# Patient Record
Sex: FEMALE | Race: WHITE | NOT HISPANIC OR LATINO | Employment: OTHER | URBAN - NONMETROPOLITAN AREA
[De-identification: names, ages, dates, MRNs, and addresses within clinical notes are randomized per-mention and may not be internally consistent; named-entity substitution may affect disease eponyms.]

---

## 2023-03-22 DIAGNOSIS — I10 PRIMARY HYPERTENSION: Primary | ICD-10-CM

## 2023-03-22 RX ORDER — CARVEDILOL 6.25 MG/1
6.25 TABLET ORAL 2 TIMES DAILY
COMMUNITY
Start: 2020-02-04 | End: 2023-03-22 | Stop reason: SDUPTHER

## 2023-03-22 RX ORDER — CARVEDILOL 6.25 MG/1
6.25 TABLET ORAL 2 TIMES DAILY
Qty: 180 TABLET | Refills: 0 | Status: SHIPPED | OUTPATIENT
Start: 2023-03-22 | End: 2023-06-16

## 2023-03-30 DIAGNOSIS — F33.0 DEPRESSION, MAJOR, RECURRENT, MILD (CMS-HCC): ICD-10-CM

## 2023-03-30 PROBLEM — F41.9 MILD ANXIETY: Status: ACTIVE | Noted: 2023-03-30

## 2023-03-30 RX ORDER — SERTRALINE HYDROCHLORIDE 100 MG/1
100 TABLET, FILM COATED ORAL DAILY
COMMUNITY
Start: 2019-06-26 | End: 2023-03-31 | Stop reason: SDUPTHER

## 2023-03-31 DIAGNOSIS — E78.00 HYPERCHOLESTEROLEMIA: ICD-10-CM

## 2023-03-31 DIAGNOSIS — E11.29 TYPE 2 DIABETES MELLITUS WITH MICROALBUMINURIA, UNSPECIFIED WHETHER LONG TERM INSULIN USE: ICD-10-CM

## 2023-03-31 DIAGNOSIS — R80.9 TYPE 2 DIABETES MELLITUS WITH MICROALBUMINURIA, UNSPECIFIED WHETHER LONG TERM INSULIN USE: ICD-10-CM

## 2023-03-31 DIAGNOSIS — J30.9 ALLERGIC RHINITIS, UNSPECIFIED SEASONALITY, UNSPECIFIED TRIGGER: ICD-10-CM

## 2023-03-31 RX ORDER — FLUTICASONE PROPIONATE 50 MCG
2 SPRAY, SUSPENSION (ML) NASAL DAILY
COMMUNITY
Start: 2016-06-07 | End: 2023-03-31 | Stop reason: SDUPTHER

## 2023-03-31 RX ORDER — FLUTICASONE PROPIONATE 50 MCG
2 SPRAY, SUSPENSION (ML) NASAL DAILY
Qty: 16 G | Refills: 3 | Status: SHIPPED | OUTPATIENT
Start: 2023-03-31

## 2023-03-31 RX ORDER — SERTRALINE HYDROCHLORIDE 100 MG/1
TABLET, FILM COATED ORAL
Qty: 90 TABLET | Refills: 3 | Status: SHIPPED | OUTPATIENT
Start: 2023-03-31 | End: 2024-03-18

## 2023-03-31 RX ORDER — METFORMIN HYDROCHLORIDE 500 MG/1
500 TABLET, EXTENDED RELEASE ORAL 2 TIMES DAILY
COMMUNITY
Start: 2020-12-16 | End: 2023-03-31 | Stop reason: SDUPTHER

## 2023-03-31 RX ORDER — METFORMIN HYDROCHLORIDE 500 MG/1
500 TABLET, EXTENDED RELEASE ORAL 2 TIMES DAILY
Qty: 60 TABLET | Refills: 11 | Status: SHIPPED | OUTPATIENT
Start: 2023-03-31 | End: 2023-04-27 | Stop reason: DRUGHIGH

## 2023-03-31 RX ORDER — ROSUVASTATIN CALCIUM 20 MG/1
20 TABLET, COATED ORAL DAILY
Qty: 90 TABLET | Refills: 3 | Status: SHIPPED | OUTPATIENT
Start: 2023-03-31 | End: 2024-02-09

## 2023-03-31 RX ORDER — ROSUVASTATIN CALCIUM 20 MG/1
1 TABLET, COATED ORAL DAILY
COMMUNITY
Start: 2018-05-17 | End: 2023-03-31 | Stop reason: SDUPTHER

## 2023-04-19 PROBLEM — R80.9 PROTEINURIA: Status: ACTIVE | Noted: 2023-04-19

## 2023-04-19 PROBLEM — N18.31 STAGE 3A CHRONIC KIDNEY DISEASE (MULTI): Status: ACTIVE | Noted: 2023-04-19

## 2023-04-19 PROBLEM — G47.00 INSOMNIA: Status: ACTIVE | Noted: 2023-04-19

## 2023-04-19 PROBLEM — E83.52 HYPERCALCEMIA: Status: ACTIVE | Noted: 2023-04-19

## 2023-04-19 PROBLEM — N28.9 RENAL INSUFFICIENCY: Status: ACTIVE | Noted: 2023-04-19

## 2023-04-19 PROBLEM — F33.2 MODERATELY SEVERE RECURRENT MAJOR DEPRESSION (MULTI): Status: ACTIVE | Noted: 2023-04-19

## 2023-04-19 PROBLEM — E21.3 HYPERPARATHYROIDISM (MULTI): Status: ACTIVE | Noted: 2023-04-19

## 2023-04-19 PROBLEM — E55.9 VITAMIN D DEFICIENCY: Status: ACTIVE | Noted: 2023-04-19

## 2023-04-19 PROBLEM — F41.9 SEVERE ANXIETY: Status: ACTIVE | Noted: 2023-04-19

## 2023-04-19 RX ORDER — LORATADINE 10 MG/1
TABLET ORAL
COMMUNITY

## 2023-04-19 RX ORDER — LISINOPRIL 40 MG/1
1 TABLET ORAL DAILY
COMMUNITY
Start: 2017-09-06 | End: 2023-09-06 | Stop reason: SDUPTHER

## 2023-04-19 RX ORDER — BUPROPION HYDROCHLORIDE 300 MG/1
1 TABLET ORAL DAILY
COMMUNITY
Start: 2016-06-07 | End: 2023-08-07 | Stop reason: SDUPTHER

## 2023-04-19 RX ORDER — AMLODIPINE BESYLATE 10 MG/1
1 TABLET ORAL DAILY
COMMUNITY
Start: 2016-06-07 | End: 2023-10-16

## 2023-04-19 RX ORDER — CARVEDILOL 6.25 MG/1
TABLET ORAL
COMMUNITY
Start: 2023-03-22 | End: 2023-04-24

## 2023-04-19 RX ORDER — CHOLECALCIFEROL (VITAMIN D3) 25 MCG
TABLET ORAL
COMMUNITY

## 2023-04-20 NOTE — PROGRESS NOTES
SUBJECTIVE:      Dara Shook. Is 67 y.o.. female. Here for follow up office visit-     HPI:      Follow up for BP, DM, CKD, anx, dep , allergies, insomnia, vit d , chol       Pt is doing well      Scales reviewed      Due for lab       Other medical specialists are involved in patient's care.    Any recent notes that were available were reviewed.    All conditions are monitored, evaluated and assessed regularly.    Patient is compliant with current treatment regimen/medications.    Specialists involved include:    Dr Darrel Barlow         REVIEW OF SYSTEMS:        OBJECTIVE:    There were no vitals filed for this visit.    PHYSICAL:      Patient is alert and oriented x 3 , NAD  HEAD- normocephalic and atraumatic   EYES-conjunctiva-normal, lids - normal  EARS/NOSE- normal external exam   NECK-supple,FROM  CV- RRR without murmur  PULM- CTA bilaterally, normal respiratory effort  RESPIRATORY EFFORT- normal , no retractions or nasal flaring   ABD- normoactive BS's   EXT- no edema,NT  SKIN- no abnormal skin lesions noted  NEURO- no focal deficits  PSYCH- pleasant, normal judgement and insight        The number and complexity of problems addressed is considered moderate.  The amount and/or complexity of data reviewed and analyzed is considered moderate. The risk of complications and/or morbidity/mortality of patient is considered moderate. Overall, this patient encounter is considered a moderate risk visit.    Possible side effects and cautions  All SSRIs are thought to work in a similar way and generally can cause similar side effects, though some people may not experience any. Many side effects may go away after the first few weeks of treatment, while others may lead you and your doctor to try a different drug.    If you can't tolerate one SSRI, you may be able to tolerate a different one, as SSRIs differ in their potencies at blocking serotonin reuptake and in how quickly the body eliminates (metabolizes)  the drug.    Possible side effects of SSRIs may include, among others:    Nausea, vomiting or diarrhea  Headache  Drowsiness  Dry mouth  Insomnia  Nervousness, agitation or restlessness  Dizziness  Sexual problems, such as reduced sexual desire, difficulty reaching orgasm or inability to maintain an erection (erectile dysfunction)  Impact on appetite, leading to weight loss or weight gain  Taking your medication with food may reduce the risk of nausea. Also, as long as your medication doesn't keep you from sleeping, you can reduce the impact of nausea by taking it at bedtime.    Which antidepressant is best for you depends on a number of issues, such as your symptoms and any other health conditions you may have. Ask your doctor and pharmacist about the most common possible side effects for your specific SSRI and read the patient medication guide that comes with the prescription.    Safety issues  SSRIs are generally safe for most people. However, in some circumstances they can cause problems. For example, high doses of citalopram may cause dangerous abnormal heart rhythms, so doses over 40 milligrams (mg) a day should be avoided according to the FDA and the . Issues to discuss with your doctor before you take an SSRI include:    Drug interactions. When taking an antidepressant, tell your doctor about any other prescription or over-the-counter medications, herbs or other supplements you're taking. Some antidepressants can interfere with the effectiveness of other medications, and some can cause dangerous reactions when combined with certain medications or herbal supplements.    For example, SSRIs may increase your risk of bleeding, especially when you're taking other medications that increase the risk of bleeding, such as nonsteroidal anti-inflammatory drugs (NSAIDs), aspirin, warfarin (Coumadin, Jantoven) and other blood thinners.    Serotonin syndrome. Rarely, an antidepressant can cause high levels of  serotonin to accumulate in your body. Serotonin syndrome most often occurs when two medications that raise the level of serotonin are combined. These include, for example, other antidepressants, certain pain or headache medications, and the herbal supplement Monica's wort.    Signs and symptoms of serotonin syndrome include anxiety, agitation, high fever, sweating, confusion, tremors, restlessness, lack of coordination, major changes in blood pressure and a rapid heart rate. Seek immediate medical attention if you have any of these signs or symptoms.    Antidepressants and pregnancy. Talk to your doctor about the risks and benefits of using specific antidepressants. Some antidepressants may harm your baby if you take them during pregnancy or while you're breast-feeding. If you're taking an antidepressant and you're considering getting pregnant, talk to your doctor about the possible risks. Don't stop taking your medication without contacting your doctor first, as stopping might pose risks for you.  Suicide risk and antidepressants  Most antidepressants are generally safe, but the FDA requires that all antidepressants carry black box warnings, the strictest warnings for prescriptions. In some cases, children, teenagers and young adults under 25 may have an increase in suicidal thoughts or behavior when taking antidepressants, especially in the first few weeks after starting or when the dose is changed.        ASSESSMENT/PLAN:    1. Primary hypertension        2. Stage 3a chronic kidney disease        3. Type 2 diabetes mellitus with microalbuminuria, without long-term current use of insulin (CMS/HCC)        4. Mild anxiety        5. Depression, major, recurrent, mild (CMS/HCC)        6. Allergic rhinitis, unspecified seasonality, unspecified trigger        7. Insomnia, unspecified type        8. Hypercholesterolemia        9. Vitamin D deficiency            No orders of the defined types were placed in this  encounter.        Follow with specialist as directed       Continue medication      Ordered lab      Follow up in 6 months

## 2023-04-24 ENCOUNTER — OFFICE VISIT (OUTPATIENT)
Dept: PRIMARY CARE | Facility: CLINIC | Age: 68
End: 2023-04-24
Payer: MEDICARE

## 2023-04-24 VITALS
HEART RATE: 60 BPM | RESPIRATION RATE: 14 BRPM | DIASTOLIC BLOOD PRESSURE: 75 MMHG | BODY MASS INDEX: 28.32 KG/M2 | OXYGEN SATURATION: 95 % | SYSTOLIC BLOOD PRESSURE: 133 MMHG | WEIGHT: 165.9 LBS | TEMPERATURE: 97.3 F | HEIGHT: 64 IN

## 2023-04-24 DIAGNOSIS — F33.0 DEPRESSION, MAJOR, RECURRENT, MILD (CMS-HCC): ICD-10-CM

## 2023-04-24 DIAGNOSIS — N18.31 STAGE 3A CHRONIC KIDNEY DISEASE (MULTI): ICD-10-CM

## 2023-04-24 DIAGNOSIS — G47.00 INSOMNIA, UNSPECIFIED TYPE: ICD-10-CM

## 2023-04-24 DIAGNOSIS — R22.32 SUBCUTANEOUS MASS OF LEFT THUMB: ICD-10-CM

## 2023-04-24 DIAGNOSIS — I10 PRIMARY HYPERTENSION: Primary | ICD-10-CM

## 2023-04-24 DIAGNOSIS — F41.9 MILD ANXIETY: ICD-10-CM

## 2023-04-24 DIAGNOSIS — E11.29 TYPE 2 DIABETES MELLITUS WITH MICROALBUMINURIA, WITHOUT LONG-TERM CURRENT USE OF INSULIN (MULTI): ICD-10-CM

## 2023-04-24 DIAGNOSIS — E78.00 HYPERCHOLESTEREMIA: ICD-10-CM

## 2023-04-24 DIAGNOSIS — E55.9 VITAMIN D DEFICIENCY: ICD-10-CM

## 2023-04-24 DIAGNOSIS — E78.00 HYPERCHOLESTEROLEMIA: ICD-10-CM

## 2023-04-24 DIAGNOSIS — R80.9 TYPE 2 DIABETES MELLITUS WITH MICROALBUMINURIA, WITHOUT LONG-TERM CURRENT USE OF INSULIN (MULTI): ICD-10-CM

## 2023-04-24 DIAGNOSIS — J30.9 ALLERGIC RHINITIS, UNSPECIFIED SEASONALITY, UNSPECIFIED TRIGGER: ICD-10-CM

## 2023-04-24 PROCEDURE — 1036F TOBACCO NON-USER: CPT | Performed by: FAMILY MEDICINE

## 2023-04-24 PROCEDURE — 1159F MED LIST DOCD IN RCRD: CPT | Performed by: FAMILY MEDICINE

## 2023-04-24 PROCEDURE — 4010F ACE/ARB THERAPY RXD/TAKEN: CPT | Performed by: FAMILY MEDICINE

## 2023-04-24 PROCEDURE — 3075F SYST BP GE 130 - 139MM HG: CPT | Performed by: FAMILY MEDICINE

## 2023-04-24 PROCEDURE — 1160F RVW MEDS BY RX/DR IN RCRD: CPT | Performed by: FAMILY MEDICINE

## 2023-04-24 PROCEDURE — 99214 OFFICE O/P EST MOD 30 MIN: CPT | Performed by: FAMILY MEDICINE

## 2023-04-24 PROCEDURE — 3078F DIAST BP <80 MM HG: CPT | Performed by: FAMILY MEDICINE

## 2023-04-24 ASSESSMENT — PATIENT HEALTH QUESTIONNAIRE - PHQ9
1. LITTLE INTEREST OR PLEASURE IN DOING THINGS: SEVERAL DAYS
7. TROUBLE CONCENTRATING ON THINGS, SUCH AS READING THE NEWSPAPER OR WATCHING TELEVISION: MORE THAN HALF THE DAYS
4. FEELING TIRED OR HAVING LITTLE ENERGY: MORE THAN HALF THE DAYS
SUM OF ALL RESPONSES TO PHQ9 QUESTIONS 1 AND 2: 2
5. POOR APPETITE OR OVEREATING: NOT AT ALL
10. IF YOU CHECKED OFF ANY PROBLEMS, HOW DIFFICULT HAVE THESE PROBLEMS MADE IT FOR YOU TO DO YOUR WORK, TAKE CARE OF THINGS AT HOME, OR GET ALONG WITH OTHER PEOPLE: SOMEWHAT DIFFICULT
SUM OF ALL RESPONSES TO PHQ QUESTIONS 1-9: 12
2. FEELING DOWN, DEPRESSED OR HOPELESS: SEVERAL DAYS
8. MOVING OR SPEAKING SO SLOWLY THAT OTHER PEOPLE COULD HAVE NOTICED. OR THE OPPOSITE, BEING SO FIGETY OR RESTLESS THAT YOU HAVE BEEN MOVING AROUND A LOT MORE THAN USUAL: MORE THAN HALF THE DAYS
9. THOUGHTS THAT YOU WOULD BE BETTER OFF DEAD, OR OF HURTING YOURSELF: NOT AT ALL
6. FEELING BAD ABOUT YOURSELF - OR THAT YOU ARE A FAILURE OR HAVE LET YOURSELF OR YOUR FAMILY DOWN: SEVERAL DAYS
3. TROUBLE FALLING OR STAYING ASLEEP OR SLEEPING TOO MUCH: NEARLY EVERY DAY

## 2023-04-24 ASSESSMENT — ANXIETY QUESTIONNAIRES
3. WORRYING TOO MUCH ABOUT DIFFERENT THINGS: SEVERAL DAYS
4. TROUBLE RELAXING: NOT AT ALL
GAD7 TOTAL SCORE: 6
6. BECOMING EASILY ANNOYED OR IRRITABLE: MORE THAN HALF THE DAYS
7. FEELING AFRAID AS IF SOMETHING AWFUL MIGHT HAPPEN: NOT AT ALL
5. BEING SO RESTLESS THAT IT IS HARD TO SIT STILL: SEVERAL DAYS
IF YOU CHECKED OFF ANY PROBLEMS ON THIS QUESTIONNAIRE, HOW DIFFICULT HAVE THESE PROBLEMS MADE IT FOR YOU TO DO YOUR WORK, TAKE CARE OF THINGS AT HOME, OR GET ALONG WITH OTHER PEOPLE: SOMEWHAT DIFFICULT
2. NOT BEING ABLE TO STOP OR CONTROL WORRYING: SEVERAL DAYS
1. FEELING NERVOUS, ANXIOUS, OR ON EDGE: SEVERAL DAYS

## 2023-04-24 NOTE — PROGRESS NOTES
"SUBJECTIVE:      Dara Shook. Is 67 y.o.. female. Here for follow up office visit-     HPI:      Follow up for BP, DM, CKD, anx, dep , allergies, insomnia, vit d , chol     Pt states that she thinks that she has a cyst on her left thumb knuckle- this has been on her thumb about 1 month    Pt is doing well      Scales reviewed      Due for lab       Taking 2 metformin daily       Other medical specialists are involved in patient's care.    Any recent notes that were available were reviewed.    All conditions are monitored, evaluated and assessed regularly.    Patient is compliant with current treatment regimen/medications.    Specialists involved include:    Dr Darrel Barlow         REVIEW OF SYSTEMS:        OBJECTIVE:    Vitals:    04/24/23 1027   BP: 133/75   BP Location: Left arm   Patient Position: Sitting   BP Cuff Size: Small adult   Pulse: 60   Resp: 14   Temp: 36.3 °C (97.3 °F)   TempSrc: Temporal   SpO2: 95%   Weight: 75.3 kg (165 lb 14.4 oz)   Height: 1.626 m (5' 4\")       PHYSICAL:      Patient is alert and oriented x 3 , NAD  HEAD- normocephalic and atraumatic   EYES-conjunctiva-normal, lids - normal  EARS/NOSE- normal external exam   NECK-supple,FROM  CV- RRR without murmur  PULM- CTA bilaterally, normal respiratory effort  RESPIRATORY EFFORT- normal , no retractions or nasal flaring   ABD- normoactive BS's   EXT- no edema,NT  SKIN- no abnormal skin lesions noted  NEURO- no focal deficits  PSYCH- pleasant, normal judgement and insight        The number and complexity of problems addressed is considered moderate.  The amount and/or complexity of data reviewed and analyzed is considered moderate. The risk of complications and/or morbidity/mortality of patient is considered moderate. Overall, this patient encounter is considered a moderate risk visit.    Possible side effects and cautions  All SSRIs are thought to work in a similar way and generally can cause similar side effects, though some " people may not experience any. Many side effects may go away after the first few weeks of treatment, while others may lead you and your doctor to try a different drug.    If you can't tolerate one SSRI, you may be able to tolerate a different one, as SSRIs differ in their potencies at blocking serotonin reuptake and in how quickly the body eliminates (metabolizes) the drug.    Possible side effects of SSRIs may include, among others:    Nausea, vomiting or diarrhea  Headache  Drowsiness  Dry mouth  Insomnia  Nervousness, agitation or restlessness  Dizziness  Sexual problems, such as reduced sexual desire, difficulty reaching orgasm or inability to maintain an erection (erectile dysfunction)  Impact on appetite, leading to weight loss or weight gain  Taking your medication with food may reduce the risk of nausea. Also, as long as your medication doesn't keep you from sleeping, you can reduce the impact of nausea by taking it at bedtime.    Which antidepressant is best for you depends on a number of issues, such as your symptoms and any other health conditions you may have. Ask your doctor and pharmacist about the most common possible side effects for your specific SSRI and read the patient medication guide that comes with the prescription.    Safety issues  SSRIs are generally safe for most people. However, in some circumstances they can cause problems. For example, high doses of citalopram may cause dangerous abnormal heart rhythms, so doses over 40 milligrams (mg) a day should be avoided according to the FDA and the . Issues to discuss with your doctor before you take an SSRI include:    Drug interactions. When taking an antidepressant, tell your doctor about any other prescription or over-the-counter medications, herbs or other supplements you're taking. Some antidepressants can interfere with the effectiveness of other medications, and some can cause dangerous reactions when combined with certain  medications or herbal supplements.    For example, SSRIs may increase your risk of bleeding, especially when you're taking other medications that increase the risk of bleeding, such as nonsteroidal anti-inflammatory drugs (NSAIDs), aspirin, warfarin (Coumadin, Jantoven) and other blood thinners.    Serotonin syndrome. Rarely, an antidepressant can cause high levels of serotonin to accumulate in your body. Serotonin syndrome most often occurs when two medications that raise the level of serotonin are combined. These include, for example, other antidepressants, certain pain or headache medications, and the herbal supplement Benkelman's wort.    Signs and symptoms of serotonin syndrome include anxiety, agitation, high fever, sweating, confusion, tremors, restlessness, lack of coordination, major changes in blood pressure and a rapid heart rate. Seek immediate medical attention if you have any of these signs or symptoms.    Antidepressants and pregnancy. Talk to your doctor about the risks and benefits of using specific antidepressants. Some antidepressants may harm your baby if you take them during pregnancy or while you're breast-feeding. If you're taking an antidepressant and you're considering getting pregnant, talk to your doctor about the possible risks. Don't stop taking your medication without contacting your doctor first, as stopping might pose risks for you.  Suicide risk and antidepressants  Most antidepressants are generally safe, but the FDA requires that all antidepressants carry black box warnings, the strictest warnings for prescriptions. In some cases, children, teenagers and young adults under 25 may have an increase in suicidal thoughts or behavior when taking antidepressants, especially in the first few weeks after starting or when the dose is changed.        ASSESSMENT/PLAN:    1. Primary hypertension  Basic Metabolic Panel      2. Stage 3a chronic kidney disease        3. Type 2 diabetes mellitus  with microalbuminuria, without long-term current use of insulin (CMS/HCC)  Hemoglobin A1C      4. Mild anxiety        5. Depression, major, recurrent, mild (CMS/HCC)        6. Allergic rhinitis, unspecified seasonality, unspecified trigger        7. Insomnia, unspecified type        8. Hypercholesterolemia  Hepatic Function Panel      9. Vitamin D deficiency  Vitamin D, Total      10. Hypercholesteremia  Lipid Panel      11. Subcutaneous mass of left thumb  Referral to Orthopaedic Surgery          Orders Placed This Encounter   Procedures    Basic Metabolic Panel    Hepatic Function Panel    Lipid Panel    Vitamin D, Total    Hemoglobin A1C    Referral to Orthopaedic Surgery         Follow with specialist as directed       Continue medication      Ordered lab      Follow up in 6 months

## 2023-04-26 ENCOUNTER — LAB (OUTPATIENT)
Dept: LAB | Facility: LAB | Age: 68
End: 2023-04-26
Payer: MEDICARE

## 2023-04-26 DIAGNOSIS — E11.29 TYPE 2 DIABETES MELLITUS WITH MICROALBUMINURIA, WITHOUT LONG-TERM CURRENT USE OF INSULIN (MULTI): ICD-10-CM

## 2023-04-26 DIAGNOSIS — I10 PRIMARY HYPERTENSION: ICD-10-CM

## 2023-04-26 DIAGNOSIS — E78.00 HYPERCHOLESTEROLEMIA: ICD-10-CM

## 2023-04-26 DIAGNOSIS — E55.9 VITAMIN D DEFICIENCY: ICD-10-CM

## 2023-04-26 DIAGNOSIS — E78.00 HYPERCHOLESTEREMIA: ICD-10-CM

## 2023-04-26 DIAGNOSIS — R80.9 TYPE 2 DIABETES MELLITUS WITH MICROALBUMINURIA, WITHOUT LONG-TERM CURRENT USE OF INSULIN (MULTI): ICD-10-CM

## 2023-04-26 LAB
ALANINE AMINOTRANSFERASE (SGPT) (U/L) IN SER/PLAS: 36 U/L (ref 7–45)
ALBUMIN (G/DL) IN SER/PLAS: 4.7 G/DL (ref 3.4–5)
ALKALINE PHOSPHATASE (U/L) IN SER/PLAS: 105 U/L (ref 33–136)
ANION GAP IN SER/PLAS: 13 MMOL/L (ref 10–20)
ASPARTATE AMINOTRANSFERASE (SGOT) (U/L) IN SER/PLAS: 25 U/L (ref 9–39)
BILIRUBIN DIRECT (MG/DL) IN SER/PLAS: 0.1 MG/DL (ref 0–0.3)
BILIRUBIN TOTAL (MG/DL) IN SER/PLAS: 0.5 MG/DL (ref 0–1.2)
CALCIDIOL (25 OH VITAMIN D3) (NG/ML) IN SER/PLAS: 50 NG/ML
CALCIUM (MG/DL) IN SER/PLAS: 9.7 MG/DL (ref 8.6–10.6)
CARBON DIOXIDE, TOTAL (MMOL/L) IN SER/PLAS: 28 MMOL/L (ref 21–32)
CHLORIDE (MMOL/L) IN SER/PLAS: 104 MMOL/L (ref 98–107)
CHOLESTEROL (MG/DL) IN SER/PLAS: 159 MG/DL (ref 0–199)
CHOLESTEROL IN HDL (MG/DL) IN SER/PLAS: 40 MG/DL
CHOLESTEROL/HDL RATIO: 4
CREATININE (MG/DL) IN SER/PLAS: 1.13 MG/DL (ref 0.5–1.05)
ESTIMATED AVERAGE GLUCOSE FOR HBA1C: 171 MG/DL
GFR FEMALE: 53 ML/MIN/1.73M2
GLUCOSE (MG/DL) IN SER/PLAS: 164 MG/DL (ref 74–99)
HEMOGLOBIN A1C/HEMOGLOBIN TOTAL IN BLOOD: 7.6 %
LDL: 58 MG/DL (ref 0–99)
NON HDL CHOLESTEROL: 119 MG/DL
POTASSIUM (MMOL/L) IN SER/PLAS: 4.6 MMOL/L (ref 3.5–5.3)
PROTEIN TOTAL: 7.2 G/DL (ref 6.4–8.2)
SODIUM (MMOL/L) IN SER/PLAS: 140 MMOL/L (ref 136–145)
TRIGLYCERIDE (MG/DL) IN SER/PLAS: 305 MG/DL (ref 0–149)
UREA NITROGEN (MG/DL) IN SER/PLAS: 18 MG/DL (ref 6–23)
VLDL: 61 MG/DL (ref 0–40)

## 2023-04-26 PROCEDURE — 80048 BASIC METABOLIC PNL TOTAL CA: CPT

## 2023-04-26 PROCEDURE — 82306 VITAMIN D 25 HYDROXY: CPT

## 2023-04-26 PROCEDURE — 83036 HEMOGLOBIN GLYCOSYLATED A1C: CPT

## 2023-04-26 PROCEDURE — 80076 HEPATIC FUNCTION PANEL: CPT

## 2023-04-26 PROCEDURE — 80061 LIPID PANEL: CPT

## 2023-04-26 PROCEDURE — 36415 COLL VENOUS BLD VENIPUNCTURE: CPT

## 2023-04-27 DIAGNOSIS — R80.9 TYPE 2 DIABETES MELLITUS WITH MICROALBUMINURIA, UNSPECIFIED WHETHER LONG TERM INSULIN USE: ICD-10-CM

## 2023-04-27 DIAGNOSIS — E11.29 TYPE 2 DIABETES MELLITUS WITH MICROALBUMINURIA, WITHOUT LONG-TERM CURRENT USE OF INSULIN (MULTI): ICD-10-CM

## 2023-04-27 DIAGNOSIS — E11.29 TYPE 2 DIABETES MELLITUS WITH MICROALBUMINURIA, UNSPECIFIED WHETHER LONG TERM INSULIN USE: ICD-10-CM

## 2023-04-27 DIAGNOSIS — R80.9 TYPE 2 DIABETES MELLITUS WITH MICROALBUMINURIA, WITHOUT LONG-TERM CURRENT USE OF INSULIN (MULTI): ICD-10-CM

## 2023-04-27 DIAGNOSIS — I10 PRIMARY HYPERTENSION: Primary | ICD-10-CM

## 2023-04-27 RX ORDER — METFORMIN HYDROCHLORIDE 500 MG/1
TABLET, EXTENDED RELEASE ORAL
Qty: 60 TABLET | Refills: 11 | Status: SHIPPED | OUTPATIENT
Start: 2023-04-27 | End: 2024-02-09

## 2023-05-12 LAB
ANION GAP IN SER/PLAS: 13 MMOL/L (ref 10–20)
APPEARANCE, URINE: ABNORMAL
BACTERIA, URINE: ABNORMAL /HPF
BILIRUBIN, URINE: NEGATIVE
BLOOD, URINE: NEGATIVE
CALCIUM (MG/DL) IN SER/PLAS: 9.9 MG/DL (ref 8.6–10.6)
CARBON DIOXIDE, TOTAL (MMOL/L) IN SER/PLAS: 29 MMOL/L (ref 21–32)
CHLORIDE (MMOL/L) IN SER/PLAS: 103 MMOL/L (ref 98–107)
COLOR, URINE: ABNORMAL
CREATININE (MG/DL) IN SER/PLAS: 1.13 MG/DL (ref 0.5–1.05)
CREATININE (MG/DL) IN URINE: 190 MG/DL (ref 20–320)
GFR FEMALE: 53 ML/MIN/1.73M2
GLUCOSE (MG/DL) IN SER/PLAS: 190 MG/DL (ref 74–99)
GLUCOSE, URINE: NEGATIVE MG/DL
HYALINE CASTS, URINE: ABNORMAL /LPF
KETONES, URINE: ABNORMAL MG/DL
LEUKOCYTE ESTERASE, URINE: ABNORMAL
NITRITE, URINE: NEGATIVE
PH, URINE: 5 (ref 5–8)
POTASSIUM (MMOL/L) IN SER/PLAS: 4.4 MMOL/L (ref 3.5–5.3)
PROTEIN (MG/DL) IN URINE: 133 MG/DL (ref 5–24)
PROTEIN, URINE: ABNORMAL MG/DL
PROTEIN/CREATININE (MG/MG) IN URINE: 0.7 MG/MG CREAT (ref 0–0.17)
RBC, URINE: 4 /HPF (ref 0–5)
SODIUM (MMOL/L) IN SER/PLAS: 141 MMOL/L (ref 136–145)
SPECIFIC GRAVITY, URINE: 1.02 (ref 1–1.03)
SQUAMOUS EPITHELIAL CELLS, URINE: 3 /HPF
URATE (MG/DL) IN SER/PLAS: 6.4 MG/DL (ref 2.3–6.7)
UREA NITROGEN (MG/DL) IN SER/PLAS: 16 MG/DL (ref 6–23)
UROBILINOGEN, URINE: <2 MG/DL (ref 0–1.9)
WBC CLUMPS, URINE: ABNORMAL /HPF
WBC, URINE: 104 /HPF (ref 0–5)

## 2023-05-15 LAB — URINE CULTURE: NORMAL

## 2023-05-18 LAB
URINE CULTURE: ABNORMAL
URINE CULTURE: ABNORMAL

## 2023-05-31 PROBLEM — M67.442 DIGITAL MUCOUS CYST OF LEFT HAND: Status: ACTIVE | Noted: 2023-05-31

## 2023-05-31 NOTE — PROGRESS NOTES
HPI:    Dara Shook. Is 67 y.o.. female. Here for acute visit-           What concern/ problem/pain/symptom  brings you here today?  LRE rash/ wound       how long has pt had sxs?  About a week     describe symptoms-  Every once in a while she gets a sharp pain   Tuesday she had a temp of 101 and slept all day   Is reddening off and on and oozing   No itching   Does sit out side once in awhile     has pt tried anything for current symptoms, including medications (OTC or prescription)  ?    Has been using peroxide and bacitracin     has pt been seen recently for this problem ( within past 2-3 weeks) ? Went to  on Monday, [facility]       what treatment was provided? None       Social History     Tobacco Use   Smoking Status Never   Smokeless Tobacco Never   Vaping Use   Vaping Status Not on file             OBJECTIVE:          Vitals:    06/02/23 1023   BP: 151/69   BP Location: Left arm   Patient Position: Sitting   BP Cuff Size: Small adult   Pulse: 66   Temp: 36.6 °C (97.9 °F)   SpO2: 96%   Weight: 72.8 kg (160 lb 6.4 oz)           PHYSICAL:      Pt is A and O x3, NAD, Vital signs are within normal limits  General Appearance- normal , good hygiene,talks easily  EXTREMITIES-no edema,NT  SKIN- RLE- anterior right tibia mid-shaft- quarter -sized area centrally excoriated open skin - granulated tissue - with marginal erythema to 6 cm diameter  , not warm, no drainage , no swelling, no dark areas   NEURO- no focal deficits  PSYCH- pleasant, normal judgement and insight      The number and complexity of problems addressed is considered moderate.  The amount and/or complexity of data reviewed and analyzed is considered moderate. The risk of complications and/or morbidity/mortality of patient is considered moderate. Overall, this patient encounter is considered a moderate risk visit.      What are antibiotics?  Antibiotics are medicines that help people fight infections caused by bacteria. They work by killing  bacteria that are in the body. These medicines come in many different forms, including pills, ointments, and liquids that are given by injection.    Antibiotics can do a lot of good. For people with serious bacterial infections, antibiotics can save lives. But people use them far too often, even when they're not needed. This is causing a very serious problem called antibiotic resistance. Antibiotic resistance happens when bacteria that have been exposed to an antibiotic change so that the antibiotic can no longer kill them. In those bacteria, the antibiotic has no effect. Because of this problem, doctors are having a harder and harder time treating infections. Experts worry that there will soon be infections that don't respond to any antibiotics.    When are antibiotics helpful?  Antibiotics can help people fight off infections caused by bacteria. They do not work on infections caused by viruses.    Some common bacterial infections that are treated with antibiotics include:    Strep throat    Pneumonia (an infection of the lungs)    Bladder infections    Infections you catch through sex, such as gonorrhea and chlamydia    When are antibiotics NOT helpful?    Antibiotics do not work on infections caused by viruses.    Antibiotics are not helpful for the common cold, because the common cold is caused by a virus.    Antibiotics are not helpful for the flu, because the flu is caused by a virus. (People with the flu can be treated with medicines called antiviral medicines, which are different from antibiotics.)      Even though antibiotics don't work on infections caused by viruses, people sometimes believe that they do. That's because they took antibiotics for a viral infection before and then got better. The problem is that those people would have gotten better with or without an antibiotic. When they get better with the antibiotic, they think that's what cured them, when in reality the antibiotic had nothing to do  "with it.    What's the harm in taking antibiotics even if they might not help?  There are many reasons you should not take antibiotics unless you absolutely need them:    Antibiotics cause side effects, such as nausea, vomiting, and diarrhea. They can even make it more likely that you will get a different kind of infection, such as yeast infection (if you are a woman).    Allergies to antibiotics are common. You can develop an allergy to an antibiotic, even if you have not had a problem with it before. Some allergies are just unpleasant, causing rashes and itching. But some can be very serious and even life-threatening. It is better to avoid any risk of an allergy, if the antibiotic wouldn't help you anyway.    Overuse of antibiotics leads to antibiotic resistance. Using antibiotics when they are not needed gives bacteria a chance to change, so that the antibiotics cannot hurt them later on. People who have infections caused by antibiotic-resistant bacteria often have to be treated in the hospital with many different antibiotics. People can even die from these infections, because there is no antibiotic that will cure them.    When should I take antibiotics?  You should take antibiotics only when a doctor or nurse prescribes them to you. You should never take antibiotics prescribed to someone else, and you should not take antibiotics that were prescribed to you for a previous illness. When prescribing an antibiotic, doctors and nurses have to carefully pick the right antibiotic for a particular infection. Not all antibiotics work on all bacteria.    If you do have an infection caused by a bacteria, your doctor or nurse might want to find out what that bacteria is, and which antibiotics can kill it. They do this by taking a \"culture\" of the bacteria and growing it in the lab. But it's not possible to do a culture on someone who has already started an antibiotic. So if you start an antibiotic without input from a " "doctor or nurse it will be impossible to know if you have taken the right one.    What can I do to reduce antibiotic resistance?  Here are some things that can help:    Do not pressure your doctor or nurse for antibiotics when they do not think you need them.    If you are prescribed antibiotics, finish all of the medicine and take it exactly as directed. Never skip doses or stop taking the medicine without talking to your doctor or nurse.    Do not give antibiotics that were prescribed to you to anyone else.    What if my doctor prescribes an antibiotic that did not work for me before?  If an antibiotic did not work for you before, that does not mean it will never work for you. If you have used an antibiotic before and it did not work, tell your doctor. But keep in mind that the infection you had before might not have been caused by the same bacteria that you have now. The \"best\" antibiotic is the right one for the bacteria causing the infection, not for the person with the infection.    What if I am allergic to an antibiotic?  If you had a bad reaction to an antibiotic, tell your doctor or nurse about it. But do not assume you are allergic unless your doctor or nurse tells you that you are.    Many people who think they are allergic to an antibiotic are wrong. If you get nauseous after taking an antibiotic, that does not mean you are allergic to it. Nausea is a common side effect of many antibiotics. If you are a woman and you get a yeast infection after taking an antibiotic, that does not mean you are allergic to it. Yeast infections are a common side effect of antibiotics.    Symptoms of antibiotic allergy can be mild and include a flat rash and itching. They can also be more serious and include:    Hives - Hives are raised, red patches of skin that are usually very itchy (picture 1).    Lip or tongue swelling    Trouble swallowing or breathing    Serious allergy symptoms can start right after you start taking " an antibiotic if you are very sensitive. Less serious symptoms, on the other hand, often start a day or more later.    Almost all antibiotics may cause possible side effects of allergic reaction, nausea, vomiting, diarrhea- most worrisome caused by C. diff, and secondary yeast infection.      Discussed potential for increased potassium with pt - recent potassium normal       ASSESSMENT/PLAN:      1. Insect bite, unspecified site, initial encounter  sulfamethoxazole-trimethoprim (Bactrim DS) 800-160 mg tablet      2. Hospital discharge follow-up  sulfamethoxazole-trimethoprim (Bactrim DS) 800-160 mg tablet      3. Cellulitis of right lower extremity  sulfamethoxazole-trimethoprim (Bactrim DS) 800-160 mg tablet                          Call if no better or if symptoms worsen

## 2023-06-02 ENCOUNTER — OFFICE VISIT (OUTPATIENT)
Dept: PRIMARY CARE | Facility: CLINIC | Age: 68
End: 2023-06-02
Payer: MEDICARE

## 2023-06-02 VITALS
DIASTOLIC BLOOD PRESSURE: 69 MMHG | SYSTOLIC BLOOD PRESSURE: 151 MMHG | HEART RATE: 66 BPM | OXYGEN SATURATION: 96 % | BODY MASS INDEX: 27.53 KG/M2 | WEIGHT: 160.4 LBS | TEMPERATURE: 97.9 F

## 2023-06-02 DIAGNOSIS — L03.115 CELLULITIS OF RIGHT LOWER EXTREMITY: ICD-10-CM
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
## 2023-06-02 DIAGNOSIS — Z09 HOSPITAL DISCHARGE FOLLOW-UP: ICD-10-CM

## 2023-06-02 PROCEDURE — 99213 OFFICE O/P EST LOW 20 MIN: CPT | Performed by: FAMILY MEDICINE

## 2023-06-02 PROCEDURE — 3077F SYST BP >= 140 MM HG: CPT | Performed by: FAMILY MEDICINE

## 2023-06-02 PROCEDURE — 1160F RVW MEDS BY RX/DR IN RCRD: CPT | Performed by: FAMILY MEDICINE

## 2023-06-02 PROCEDURE — 3078F DIAST BP <80 MM HG: CPT | Performed by: FAMILY MEDICINE

## 2023-06-02 PROCEDURE — 3051F HG A1C>EQUAL 7.0%<8.0%: CPT | Performed by: FAMILY MEDICINE

## 2023-06-02 PROCEDURE — 1159F MED LIST DOCD IN RCRD: CPT | Performed by: FAMILY MEDICINE

## 2023-06-02 PROCEDURE — 1036F TOBACCO NON-USER: CPT | Performed by: FAMILY MEDICINE

## 2023-06-02 PROCEDURE — 4010F ACE/ARB THERAPY RXD/TAKEN: CPT | Performed by: FAMILY MEDICINE

## 2023-06-02 RX ORDER — SULFAMETHOXAZOLE AND TRIMETHOPRIM 800; 160 MG/1; MG/1
1 TABLET ORAL 2 TIMES DAILY
Qty: 20 TABLET | Refills: 0 | Status: SHIPPED
Start: 2023-06-02 | End: 2023-06-02 | Stop reason: SDUPTHER

## 2023-06-02 RX ORDER — SULFAMETHOXAZOLE AND TRIMETHOPRIM 800; 160 MG/1; MG/1
1 TABLET ORAL 2 TIMES DAILY
Qty: 20 TABLET | Refills: 0 | Status: SHIPPED | OUTPATIENT
Start: 2023-06-02 | End: 2023-06-12

## 2023-06-02 NOTE — TELEPHONE ENCOUNTER
PT calling seen you today and her abx was sent to her mail away pharm. Please resend to [facility] [place].

## 2023-06-15 DIAGNOSIS — I10 PRIMARY HYPERTENSION: ICD-10-CM

## 2023-06-16 RX ORDER — CARVEDILOL 6.25 MG/1
TABLET ORAL
Qty: 180 TABLET | Refills: 3 | Status: SHIPPED | OUTPATIENT
Start: 2023-06-16

## 2023-08-07 DIAGNOSIS — F33.0 DEPRESSION, MAJOR, RECURRENT, MILD (CMS-HCC): ICD-10-CM

## 2023-08-07 DIAGNOSIS — F33.2 MODERATELY SEVERE RECURRENT MAJOR DEPRESSION (MULTI): ICD-10-CM

## 2023-08-07 RX ORDER — BUPROPION HYDROCHLORIDE 300 MG/1
300 TABLET ORAL DAILY
Qty: 90 TABLET | Refills: 11 | Status: SHIPPED | OUTPATIENT
Start: 2023-08-07 | End: 2023-09-01 | Stop reason: SDUPTHER

## 2023-09-01 DIAGNOSIS — F33.2 MODERATELY SEVERE RECURRENT MAJOR DEPRESSION (MULTI): ICD-10-CM

## 2023-09-01 DIAGNOSIS — F33.0 DEPRESSION, MAJOR, RECURRENT, MILD (CMS-HCC): ICD-10-CM

## 2023-09-01 RX ORDER — BUPROPION HYDROCHLORIDE 300 MG/1
300 TABLET ORAL DAILY
Qty: 90 TABLET | Refills: 11 | Status: SHIPPED | OUTPATIENT
Start: 2023-09-01

## 2023-09-06 DIAGNOSIS — I10 PRIMARY HYPERTENSION: ICD-10-CM

## 2023-09-06 RX ORDER — LISINOPRIL 40 MG/1
40 TABLET ORAL DAILY
Qty: 30 TABLET | Refills: 11 | Status: SHIPPED | OUTPATIENT
Start: 2023-09-06 | End: 2024-09-05

## 2023-10-16 DIAGNOSIS — I10 PRIMARY HYPERTENSION: Primary | ICD-10-CM

## 2023-10-16 RX ORDER — AMLODIPINE BESYLATE 10 MG/1
10 TABLET ORAL DAILY
Qty: 90 TABLET | Refills: 3 | Status: SHIPPED | OUTPATIENT
Start: 2023-10-16

## 2023-10-23 PROBLEM — Z00.00 MEDICARE ANNUAL WELLNESS VISIT, SUBSEQUENT: Status: ACTIVE | Noted: 2023-10-23

## 2023-10-23 PROBLEM — Z00.00 MEDICARE ANNUAL WELLNESS VISIT, SUBSEQUENT: Chronic | Status: ACTIVE | Noted: 2023-10-23

## 2023-10-23 NOTE — PROGRESS NOTES
Subjective      Pt was coming in for a complete physical exam and asked to be seen for other problems in addition to the physical portion of the visit. The patient was informed that they may be charged for both portions: discussing the medical problems they wanted addressed and the complete physical exam. Patient has signed consent and was given brochure.        Codi Shook is a 68 y.o. female here for follow up   Chief Complaint   Patient presents with    Medicare Annual Wellness Visit Subsequent    Immunizations     Requested flu vaccine.    Screening completed    Follow-up    Hypertension    Diabetes    Anxiety    Depression    Hyperlipidemia       Chief Complaint   Patient presents with    Medicare Annual Wellness Visit Subsequent    Immunizations     Requested flu vaccine.    Screening completed    Follow-up    Hypertension    Diabetes    Anxiety    Depression    Hyperlipidemia        HPI     Wellness/health maintenance also discussed/[facility] note    Pt is doing well             Follow up for BP, DM, CKD, anx, dep , allergies, insomnia, vit d , chol        Scales reviewed      Due for labs      Other medical specialists are involved in patient's care.    Any recent notes that were available were reviewed.    All conditions are monitored, evaluated and assessed regularly.    Patient is compliant with current treatment regimen/medications.    Specialists involved include:    Dr Darrel Barlow - PN 5/17/23    LIZZETH-7 Total Score: 6 (10/24/23 1045)  Patient Health Questionnaire-9 Score: 11 (10/24/23 1043)  Patient Health Questionnaire-2 Score: 2 (10/24/23 1043) Dr Pisano - PN 5/4/23    LIZZETH-7 Total Score: 6 (10/24/23 1045)  Patient Health Questionnaire-9 Score: 11 (10/24/23 1043)  Patient Health Questionnaire-2 Score: 2 (10/24/23 1043)         Medicare Wellness Exam    The patent is being seen for subsequent  annual wellness visit  Past Medical, Surgical and family History: Reviewed and updated in  chart  Medications and Supplements: Review of all medications by a prescribing practitioner or clinical pharmacist (such as prescriptions, OTC, Herbal therapies and supplements) documented in the medical record.      Immunization History   Administered Date(s) Administered    Flu vaccine (IIV4), preservative free *Check age/dose* 11/17/2016, 08/07/2017, 10/31/2018, 08/27/2019    Flu vaccine, quadrivalent, high-dose, preservative free, age 65y+ (FLUZONE) 10/19/2021, 09/19/2022    Influenza Whole 10/10/2014, 09/24/2015    Influenza, High Dose Seasonal, Preservative Free 10/08/2020    Influenza, Unspecified 10/28/2010, 09/14/2012, 10/22/2013, 10/10/2014, 09/24/2015, 11/17/2016, 08/07/2017, 10/31/2018, 08/27/2019, 10/19/2021, 09/19/2022    Pfizer Purple Cap SARS-CoV-2 03/17/2021, 04/07/2021    Pneumococcal conjugate vaccine, 13-valent (PREVNAR 13) 09/01/2020    Pneumococcal polysaccharide vaccine, 23-valent, age 2 years and older (PNEUMOVAX 23) 10/19/2021    Tdap vaccine, age 7 year and older (BOOSTRIX) 01/01/2005, 11/05/2012, 01/01/2013, 10/22/2013                     Health Maintenance Topics        Topic Date     Medicare Annual Wellness Visit (AWV) today    Mammogram      Health Maintenance Topics with due status: Not Due       Topic Last Completion Date    Colorectal Cancer Screening 05/01/2022    Lipid Panel 04/26/2023     Health Maintenance Topics with due status: Completed       Topic Last Completion Date    Hepatitis C Screening 05/08/2019    Pneumococcal Vaccine: 65+ Years 10/19/2021    Bone Density Scan 05/09/2022     Health Maintenance Topics with due status: Aged Out       Topic Date Due    HIB Vaccines Aged Out    Hepatitis B Vaccines Aged Out    IPV Vaccines Aged Out    Hepatitis A Vaccines Aged Out    Meningococcal Vaccine Aged Out    Rotavirus Vaccines Aged Out    HPV Vaccines Aged Out           Social History     Tobacco Use   Smoking Status Never   Smokeless Tobacco Never       Alcohol Use: Not on  "file         Objective        Vitals:    10/24/23 1040   BP: 120/76   BP Location: Left arm   Patient Position: Sitting   BP Cuff Size: Adult   Pulse: 63   Temp: 36.4 °C (97.6 °F)   TempSrc: Temporal   SpO2: 94%   Weight: 73.5 kg (162 lb)   Height: 1.651 m (5' 5\")         Patient is alert and oriented x 3 , NAD  HEAD- normocephalic and atraumatic   EYES-conjunctiva-normal, lids - normal  EARS/NOSE- normal external exam   NECK-supple,FROM  CV- RRR without murmur  PULM- CTA bilaterally, normal respiratory effort  RESPIRATORY EFFORT- normal , no retractions or nasal flaring   ABD- normoactive BS's   EXT- no edema,NT  SKIN- no abnormal skin lesions noted  NEURO- no focal deficits  PSYCH- pleasant, normal judgement and insight      BP Readings from Last 3 Encounters:   10/24/23 120/76   06/02/23 151/69   05/17/23 130/70           Wt Readings from Last 3 Encounters:   10/24/23 73.5 kg (162 lb)   06/02/23 72.8 kg (160 lb 6.4 oz)   05/17/23 74.9 kg (165 lb 3.2 oz)         BMI Readings from Last 3 Encounters:   10/24/23 26.96 kg/m²   06/02/23 27.53 kg/m²   05/17/23 28.36 kg/m²         The number and complexity of problems addressed is considered moderate.  The amount and/or complexity of data reviewed and analyzed is considered moderate. The risk of complications and/or morbidity/mortality of patient is considered moderate. Overall, this patient encounter is considered a moderate risk visit.    Patient has no concerns with pain today.      Patient is not on any high risk medications.      Patient is compliant with their medications.    Possible side effects and cautions  All SSRIs are thought to work in a similar way and generally can cause similar side effects, though some people may not experience any. Many side effects may go away after the first few weeks of treatment, while others may lead you and your doctor to try a different drug.    If you can't tolerate one SSRI, you may be able to tolerate a different one, as " SSRIs differ in their potencies at blocking serotonin reuptake and in how quickly the body eliminates (metabolizes) the drug.    Possible side effects of SSRIs may include, among others:    Nausea, vomiting or diarrhea  Headache  Drowsiness  Dry mouth  Insomnia  Nervousness, agitation or restlessness  Dizziness  Sexual problems, such as reduced sexual desire, difficulty reaching orgasm or inability to maintain an erection (erectile dysfunction)  Impact on appetite, leading to weight loss or weight gain  Taking your medication with food may reduce the risk of nausea. Also, as long as your medication doesn't keep you from sleeping, you can reduce the impact of nausea by taking it at bedtime.    Which antidepressant is best for you depends on a number of issues, such as your symptoms and any other health conditions you may have. Ask your doctor and pharmacist about the most common possible side effects for your specific SSRI and read the patient medication guide that comes with the prescription.    Safety issues  SSRIs are generally safe for most people. However, in some circumstances they can cause problems. For example, high doses of citalopram may cause dangerous abnormal heart rhythms, so doses over 40 milligrams (mg) a day should be avoided according to the FDA and the . Issues to discuss with your doctor before you take an SSRI include:    Drug interactions. When taking an antidepressant, tell your doctor about any other prescription or over-the-counter medications, herbs or other supplements you're taking. Some antidepressants can interfere with the effectiveness of other medications, and some can cause dangerous reactions when combined with certain medications or herbal supplements.    For example, SSRIs may increase your risk of bleeding, especially when you're taking other medications that increase the risk of bleeding, such as nonsteroidal anti-inflammatory drugs (NSAIDs), aspirin, warfarin  (Coumadin, Jantoven) and other blood thinners.    Serotonin syndrome. Rarely, an antidepressant can cause high levels of serotonin to accumulate in your body. Serotonin syndrome most often occurs when two medications that raise the level of serotonin are combined. These include, for example, other antidepressants, certain pain or headache medications, and the herbal supplement Surrency's wort.    Signs and symptoms of serotonin syndrome include anxiety, agitation, high fever, sweating, confusion, tremors, restlessness, lack of coordination, major changes in blood pressure and a rapid heart rate. Seek immediate medical attention if you have any of these signs or symptoms.    Antidepressants and pregnancy. Talk to your doctor about the risks and benefits of using specific antidepressants. Some antidepressants may harm your baby if you take them during pregnancy or while you're breast-feeding. If you're taking an antidepressant and you're considering getting pregnant, talk to your doctor about the possible risks. Don't stop taking your medication without contacting your doctor first, as stopping might pose risks for you.  Suicide risk and antidepressants  Most antidepressants are generally safe, but the FDA requires that all antidepressants carry black box warnings, the strictest warnings for prescriptions. In some cases, children, teenagers and young adults under 25 may have an increase in suicidal thoughts or behavior when taking antidepressants, especially in the first few weeks after starting or when the dose is changed.        Assessment/Plan            Problem List Items Addressed This Visit          Active Problems    Allergic rhinitis    Depression, major, recurrent, mild (CMS/HCC)    Hypercholesterolemia    Relevant Orders    Hepatic Function Panel    Lipid Panel    Follow Up In [facility] - PCP - Established    Hypertension    Relevant Orders    Basic Metabolic Panel    Insomnia    Medicare annual  wellness visit, subsequent - Primary (Chronic)    Mild anxiety    Renal insufficiency    Stage 3a chronic kidney disease (CMS/HCC)    Type 2 diabetes mellitus with microalbuminuria (CMS/HCC)    Relevant Orders    Hemoglobin A1C    Vitamin D deficiency    Relevant Orders    Vitamin D 25-Hydroxy,Total (for eval of Vitamin D levels)     Other Visit Diagnoses       Screening for multiple conditions                    Orders Placed This Encounter   Procedures    Flu vaccine, quadrivalent, high-dose, preservative free, age 65y+ (FLUZONE)    Hepatic Function Panel    Lipid Panel    Basic Metabolic Panel    Hemoglobin A1C    Vitamin D 25-Hydroxy,Total (for eval of Vitamin D levels)         Continue medications      Ordered lab      Follow up in 6 months

## 2023-10-24 ENCOUNTER — OFFICE VISIT (OUTPATIENT)
Dept: PRIMARY CARE | Facility: CLINIC | Age: 68
End: 2023-10-24
Payer: MEDICARE

## 2023-10-24 ENCOUNTER — LAB (OUTPATIENT)
Dept: LAB | Facility: LAB | Age: 68
End: 2023-10-24
Payer: MEDICARE

## 2023-10-24 VITALS
BODY MASS INDEX: 26.99 KG/M2 | HEART RATE: 63 BPM | DIASTOLIC BLOOD PRESSURE: 76 MMHG | OXYGEN SATURATION: 94 % | TEMPERATURE: 97.6 F | WEIGHT: 162 LBS | HEIGHT: 65 IN | SYSTOLIC BLOOD PRESSURE: 120 MMHG

## 2023-10-24 DIAGNOSIS — R80.9 TYPE 2 DIABETES MELLITUS WITH MICROALBUMINURIA, WITHOUT LONG-TERM CURRENT USE OF INSULIN (MULTI): ICD-10-CM

## 2023-10-24 DIAGNOSIS — I10 PRIMARY HYPERTENSION: Chronic | ICD-10-CM

## 2023-10-24 DIAGNOSIS — Z00.00 MEDICARE ANNUAL WELLNESS VISIT, SUBSEQUENT: Primary | ICD-10-CM

## 2023-10-24 DIAGNOSIS — E55.9 VITAMIN D DEFICIENCY: Chronic | ICD-10-CM

## 2023-10-24 DIAGNOSIS — R80.9 TYPE 2 DIABETES MELLITUS WITH MICROALBUMINURIA, WITHOUT LONG-TERM CURRENT USE OF INSULIN (MULTI): Chronic | ICD-10-CM

## 2023-10-24 DIAGNOSIS — J30.9 ALLERGIC RHINITIS, UNSPECIFIED SEASONALITY, UNSPECIFIED TRIGGER: Chronic | ICD-10-CM

## 2023-10-24 DIAGNOSIS — F33.0 DEPRESSION, MAJOR, RECURRENT, MILD (CMS-HCC): Chronic | ICD-10-CM

## 2023-10-24 DIAGNOSIS — N28.9 RENAL INSUFFICIENCY: Chronic | ICD-10-CM

## 2023-10-24 DIAGNOSIS — E78.00 HYPERCHOLESTEROLEMIA: Chronic | ICD-10-CM

## 2023-10-24 DIAGNOSIS — F41.9 MILD ANXIETY: Chronic | ICD-10-CM

## 2023-10-24 DIAGNOSIS — E11.29 TYPE 2 DIABETES MELLITUS WITH MICROALBUMINURIA, WITHOUT LONG-TERM CURRENT USE OF INSULIN (MULTI): ICD-10-CM

## 2023-10-24 DIAGNOSIS — N18.31 STAGE 3A CHRONIC KIDNEY DISEASE (MULTI): Chronic | ICD-10-CM

## 2023-10-24 DIAGNOSIS — E11.29 TYPE 2 DIABETES MELLITUS WITH MICROALBUMINURIA, WITHOUT LONG-TERM CURRENT USE OF INSULIN (MULTI): Chronic | ICD-10-CM

## 2023-10-24 DIAGNOSIS — Z13.89 SCREENING FOR MULTIPLE CONDITIONS: ICD-10-CM

## 2023-10-24 DIAGNOSIS — G47.00 INSOMNIA, UNSPECIFIED TYPE: Chronic | ICD-10-CM

## 2023-10-24 PROCEDURE — 82306 VITAMIN D 25 HYDROXY: CPT

## 2023-10-24 PROCEDURE — 82248 BILIRUBIN DIRECT: CPT

## 2023-10-24 PROCEDURE — 4010F ACE/ARB THERAPY RXD/TAKEN: CPT | Performed by: FAMILY MEDICINE

## 2023-10-24 PROCEDURE — 1159F MED LIST DOCD IN RCRD: CPT | Performed by: FAMILY MEDICINE

## 2023-10-24 PROCEDURE — 99214 OFFICE O/P EST MOD 30 MIN: CPT | Performed by: FAMILY MEDICINE

## 2023-10-24 PROCEDURE — 1170F FXNL STATUS ASSESSED: CPT | Performed by: FAMILY MEDICINE

## 2023-10-24 PROCEDURE — 90662 IIV NO PRSV INCREASED AG IM: CPT | Performed by: FAMILY MEDICINE

## 2023-10-24 PROCEDURE — 80053 COMPREHEN METABOLIC PANEL: CPT

## 2023-10-24 PROCEDURE — 3078F DIAST BP <80 MM HG: CPT | Performed by: FAMILY MEDICINE

## 2023-10-24 PROCEDURE — 3051F HG A1C>EQUAL 7.0%<8.0%: CPT | Performed by: FAMILY MEDICINE

## 2023-10-24 PROCEDURE — 3074F SYST BP LT 130 MM HG: CPT | Performed by: FAMILY MEDICINE

## 2023-10-24 PROCEDURE — G0439 PPPS, SUBSEQ VISIT: HCPCS | Performed by: FAMILY MEDICINE

## 2023-10-24 PROCEDURE — 36415 COLL VENOUS BLD VENIPUNCTURE: CPT

## 2023-10-24 PROCEDURE — 83036 HEMOGLOBIN GLYCOSYLATED A1C: CPT

## 2023-10-24 PROCEDURE — 1160F RVW MEDS BY RX/DR IN RCRD: CPT | Performed by: FAMILY MEDICINE

## 2023-10-24 PROCEDURE — 1036F TOBACCO NON-USER: CPT | Performed by: FAMILY MEDICINE

## 2023-10-24 PROCEDURE — 80061 LIPID PANEL: CPT

## 2023-10-24 PROCEDURE — G0008 ADMIN INFLUENZA VIRUS VAC: HCPCS | Performed by: FAMILY MEDICINE

## 2023-10-24 ASSESSMENT — ENCOUNTER SYMPTOMS
OCCASIONAL FEELINGS OF UNSTEADINESS: 1
LOSS OF SENSATION IN FEET: 1
DEPRESSION: 1

## 2023-10-24 ASSESSMENT — ANXIETY QUESTIONNAIRES
3. WORRYING TOO MUCH ABOUT DIFFERENT THINGS: SEVERAL DAYS
IF YOU CHECKED OFF ANY PROBLEMS ON THIS QUESTIONNAIRE, HOW DIFFICULT HAVE THESE PROBLEMS MADE IT FOR YOU TO DO YOUR WORK, TAKE CARE OF THINGS AT HOME, OR GET ALONG WITH OTHER PEOPLE: SOMEWHAT DIFFICULT
5. BEING SO RESTLESS THAT IT IS HARD TO SIT STILL: SEVERAL DAYS
2. NOT BEING ABLE TO STOP OR CONTROL WORRYING: SEVERAL DAYS
7. FEELING AFRAID AS IF SOMETHING AWFUL MIGHT HAPPEN: SEVERAL DAYS
6. BECOMING EASILY ANNOYED OR IRRITABLE: SEVERAL DAYS
1. FEELING NERVOUS, ANXIOUS, OR ON EDGE: SEVERAL DAYS
4. TROUBLE RELAXING: NOT AT ALL
GAD7 TOTAL SCORE: 6

## 2023-10-24 ASSESSMENT — ACTIVITIES OF DAILY LIVING (ADL)
DRESSING: INDEPENDENT
MANAGING_FINANCES: INDEPENDENT
TAKING_MEDICATION: INDEPENDENT
BATHING: INDEPENDENT
GROCERY_SHOPPING: INDEPENDENT
DOING_HOUSEWORK: INDEPENDENT

## 2023-10-24 ASSESSMENT — PATIENT HEALTH QUESTIONNAIRE - PHQ9
4. FEELING TIRED OR HAVING LITTLE ENERGY: NEARLY EVERY DAY
8. MOVING OR SPEAKING SO SLOWLY THAT OTHER PEOPLE COULD HAVE NOTICED. OR THE OPPOSITE, BEING SO FIGETY OR RESTLESS THAT YOU HAVE BEEN MOVING AROUND A LOT MORE THAN USUAL: NOT AT ALL
9. THOUGHTS THAT YOU WOULD BE BETTER OFF DEAD, OR OF HURTING YOURSELF: NOT AT ALL
SUM OF ALL RESPONSES TO PHQ QUESTIONS 1-9: 11
6. FEELING BAD ABOUT YOURSELF - OR THAT YOU ARE A FAILURE OR HAVE LET YOURSELF OR YOUR FAMILY DOWN: SEVERAL DAYS
3. TROUBLE FALLING OR STAYING ASLEEP OR SLEEPING TOO MUCH: NEARLY EVERY DAY
1. LITTLE INTEREST OR PLEASURE IN DOING THINGS: SEVERAL DAYS
SUM OF ALL RESPONSES TO PHQ9 QUESTIONS 1 AND 2: 2
2. FEELING DOWN, DEPRESSED OR HOPELESS: SEVERAL DAYS
7. TROUBLE CONCENTRATING ON THINGS, SUCH AS READING THE NEWSPAPER OR WATCHING TELEVISION: SEVERAL DAYS
5. POOR APPETITE OR OVEREATING: SEVERAL DAYS

## 2023-10-25 LAB
25(OH)D3 SERPL-MCNC: 51 NG/ML (ref 30–100)
ALBUMIN SERPL BCP-MCNC: 4.6 G/DL (ref 3.4–5)
ALP SERPL-CCNC: 90 U/L (ref 33–136)
ALT SERPL W P-5'-P-CCNC: 32 U/L (ref 7–45)
ANION GAP SERPL CALC-SCNC: 14 MMOL/L (ref 10–20)
AST SERPL W P-5'-P-CCNC: 19 U/L (ref 9–39)
BILIRUB DIRECT SERPL-MCNC: 0.1 MG/DL (ref 0–0.3)
BILIRUB SERPL-MCNC: 0.4 MG/DL (ref 0–1.2)
BUN SERPL-MCNC: 19 MG/DL (ref 6–23)
CALCIUM SERPL-MCNC: 9.9 MG/DL (ref 8.6–10.6)
CHLORIDE SERPL-SCNC: 105 MMOL/L (ref 98–107)
CHOLEST SERPL-MCNC: 146 MG/DL (ref 0–199)
CHOLESTEROL/HDL RATIO: 3.4
CO2 SERPL-SCNC: 28 MMOL/L (ref 21–32)
CREAT SERPL-MCNC: 1.11 MG/DL (ref 0.5–1.05)
GFR SERPL CREATININE-BSD FRML MDRD: 54 ML/MIN/1.73M*2
GLUCOSE SERPL-MCNC: 131 MG/DL (ref 74–99)
HDLC SERPL-MCNC: 43.5 MG/DL
LDLC SERPL CALC-MCNC: 53 MG/DL
NON HDL CHOLESTEROL: 103 MG/DL (ref 0–149)
POTASSIUM SERPL-SCNC: 4.6 MMOL/L (ref 3.5–5.3)
PROT SERPL-MCNC: 7.5 G/DL (ref 6.4–8.2)
SODIUM SERPL-SCNC: 142 MMOL/L (ref 136–145)
TRIGL SERPL-MCNC: 246 MG/DL (ref 0–149)
VLDL: 49 MG/DL (ref 0–40)

## 2023-10-26 LAB
EST. AVERAGE GLUCOSE BLD GHB EST-MCNC: 154 MG/DL
HBA1C MFR BLD: 7 %

## 2024-02-08 DIAGNOSIS — E11.29 TYPE 2 DIABETES MELLITUS WITH MICROALBUMINURIA, UNSPECIFIED WHETHER LONG TERM INSULIN USE: ICD-10-CM

## 2024-02-08 DIAGNOSIS — E78.00 HYPERCHOLESTEROLEMIA: ICD-10-CM

## 2024-02-08 DIAGNOSIS — R80.9 TYPE 2 DIABETES MELLITUS WITH MICROALBUMINURIA, UNSPECIFIED WHETHER LONG TERM INSULIN USE: ICD-10-CM

## 2024-02-09 RX ORDER — METFORMIN HYDROCHLORIDE 500 MG/1
TABLET, EXTENDED RELEASE ORAL
Qty: 180 TABLET | Refills: 3 | Status: SHIPPED
Start: 2024-02-09 | End: 2024-05-01 | Stop reason: ALTCHOICE

## 2024-02-09 RX ORDER — ROSUVASTATIN CALCIUM 20 MG/1
20 TABLET, COATED ORAL DAILY
Qty: 90 TABLET | Refills: 3 | Status: SHIPPED | OUTPATIENT
Start: 2024-02-09

## 2024-03-18 DIAGNOSIS — F33.0 DEPRESSION, MAJOR, RECURRENT, MILD (CMS-HCC): ICD-10-CM

## 2024-03-18 RX ORDER — SERTRALINE HYDROCHLORIDE 100 MG/1
TABLET, FILM COATED ORAL
Qty: 90 TABLET | Refills: 0 | Status: SHIPPED | OUTPATIENT
Start: 2024-03-18

## 2024-04-24 PROBLEM — F33.2 MODERATELY SEVERE RECURRENT MAJOR DEPRESSION (MULTI): Chronic | Status: ACTIVE | Noted: 2023-04-19

## 2024-04-24 PROBLEM — E55.9 VITAMIN D DEFICIENCY: Chronic | Status: ACTIVE | Noted: 2023-04-19

## 2024-04-24 PROBLEM — E11.29 TYPE 2 DIABETES MELLITUS WITH MICROALBUMINURIA (MULTI): Chronic | Status: ACTIVE | Noted: 2023-03-31

## 2024-04-24 PROBLEM — E11.29 TYPE 2 DIABETES MELLITUS WITH MICROALBUMINURIA, WITHOUT LONG-TERM CURRENT USE OF INSULIN (MULTI): Chronic | Status: ACTIVE | Noted: 2024-04-24

## 2024-04-24 PROBLEM — Z13.89 SCREENING FOR MULTIPLE CONDITIONS: Chronic | Status: ACTIVE | Noted: 2024-04-24

## 2024-04-24 PROBLEM — F41.9 SEVERE ANXIETY: Chronic | Status: ACTIVE | Noted: 2023-04-19

## 2024-04-24 PROBLEM — E78.00 HYPERCHOLESTEROLEMIA: Chronic | Status: ACTIVE | Noted: 2023-03-31

## 2024-04-24 PROBLEM — J30.9 ALLERGIC RHINITIS: Chronic | Status: ACTIVE | Noted: 2023-03-31

## 2024-04-24 PROBLEM — N28.9 RENAL INSUFFICIENCY: Chronic | Status: ACTIVE | Noted: 2023-04-19

## 2024-04-24 PROBLEM — F41.9 MILD ANXIETY: Chronic | Status: ACTIVE | Noted: 2023-03-30

## 2024-04-24 PROBLEM — G47.00 INSOMNIA: Chronic | Status: ACTIVE | Noted: 2023-04-19

## 2024-04-24 PROBLEM — I10 HYPERTENSION: Chronic | Status: ACTIVE | Noted: 2023-03-22

## 2024-04-24 PROBLEM — R80.9 TYPE 2 DIABETES MELLITUS WITH MICROALBUMINURIA (MULTI): Chronic | Status: ACTIVE | Noted: 2023-03-31

## 2024-04-24 PROBLEM — E21.3 HYPERPARATHYROIDISM (MULTI): Chronic | Status: ACTIVE | Noted: 2023-04-19

## 2024-04-24 PROBLEM — N18.31 STAGE 3A CHRONIC KIDNEY DISEASE (MULTI): Chronic | Status: ACTIVE | Noted: 2023-04-19

## 2024-04-24 PROBLEM — R80.9 TYPE 2 DIABETES MELLITUS WITH MICROALBUMINURIA, WITHOUT LONG-TERM CURRENT USE OF INSULIN (MULTI): Chronic | Status: ACTIVE | Noted: 2024-04-24

## 2024-04-24 PROBLEM — Z12.11 SCREEN FOR COLON CANCER: Chronic | Status: ACTIVE | Noted: 2024-04-24

## 2024-04-24 PROBLEM — F33.0 DEPRESSION, MAJOR, RECURRENT, MILD (CMS-HCC): Chronic | Status: ACTIVE | Noted: 2023-03-30

## 2024-04-24 PROBLEM — R80.9 PROTEINURIA: Chronic | Status: ACTIVE | Noted: 2023-04-19

## 2024-04-24 NOTE — PROGRESS NOTES
Subjective      Pt was coming in for a complete physical exam and asked to be seen for other problems in addition to the physical portion of the visit. The patient was informed that they may be charged for both portions: discussing the medical problems they wanted addressed and the complete physical exam. Patient has signed consent and was given brochure.        Codi Shook is a 68 y.o. female here for follow up   Chief Complaint   Patient presents with    Medicare Annual Wellness Visit Subsequent     [facility]       Chief Complaint   Patient presents with    Medicare Annual Wellness Visit Subsequent     [facility]        HPI     Wellness/health maintenance also discussed/[facility] note    Pt is doing well             Follow up for BP, DM, CKD, anx, dep , allergies, insomnia, vit d , chol        Scales reviewed        LIZZETH-7 Total Score: 6 (04/26/24 1012)  Patient Health Questionnaire-9 Score: 7 (04/26/24 1011)  Patient Health Questionnaire-2 Score: 2 (04/26/24 1011)         Due for labs      Other medical specialists are involved in patient's care.    Any recent notes that were available were reviewed.    All conditions are monitored, evaluated and assessed regularly.    Patient is compliant with current treatment regimen/medications.    Specialists involved include:    Dr Darrel Barlow - PN 5/17/23    LIZZETH-7 Total Score: 6 (04/26/24 1012)  Patient Health Questionnaire-9 Score: 7 (04/26/24 1011)  Patient Health Questionnaire-2 Score: 2 (04/26/24 1011) Dr Pisano - PN 5/4/23    LIZZETH-7 Total Score: 6 (04/26/24 1012)  Patient Health Questionnaire-9 Score: 7 (04/26/24 1011)  Patient Health Questionnaire-2 Score: 2 (04/26/24 1011)         Medicare Wellness Exam    The patent is being seen for subsequent  annual wellness visit  Past Medical, Surgical and family History: Reviewed and updated in chart  Medications and Supplements: Review of all medications by a prescribing practitioner or clinical pharmacist (such as prescriptions,  OTC, Herbal therapies and supplements) documented in the medical record.      Immunization History   Administered Date(s) Administered    Flu vaccine (IIV4), preservative free *Check age/dose* 11/17/2016, 08/07/2017, 10/31/2018, 08/27/2019    Flu vaccine, quadrivalent, high-dose, preservative free, age 65y+ (FLUZONE) 10/19/2021, 09/19/2022, 10/24/2023    Influenza Whole 10/10/2014, 09/24/2015    Influenza, High Dose Seasonal, Preservative Free 10/08/2020    Influenza, Unspecified 10/28/2010, 09/14/2012, 10/22/2013, 10/10/2014, 09/24/2015, 11/17/2016, 08/07/2017, 10/31/2018, 08/27/2019, 10/19/2021, 09/19/2022    Pfizer Purple Cap SARS-CoV-2 03/17/2021, 04/07/2021    Pneumococcal conjugate vaccine, 13-valent (PREVNAR 13) 09/01/2020    Pneumococcal polysaccharide vaccine, 23-valent, age 2 years and older (PNEUMOVAX 23) 10/19/2021    Tdap vaccine, age 7 year and older (BOOSTRIX, ADACEL) 01/01/2005, 11/05/2012, 01/01/2013, 10/22/2013                     Health Maintenance Topics        Topic Date     Medicare Annual Wellness Visit (AWV) today    Mammogram 6/2022     Health Maintenance Topics with due status: Not Due       Topic Last Completion Date    Colorectal Cancer Screening- Cologuard neg  05/01/2022     Health Maintenance Topics with due status: Completed       Topic Last Completion Date    Hepatitis C Screening 05/08/2019    Pneumococcal Vaccine: 65+ Years- P-23 10/19/2021    Bone Density Scan 05/09/2022     Health Maintenance Topics with due status: Aged Out       Topic Date Due    HIB Vaccines Aged Out    Hepatitis B Vaccines Aged Out    IPV Vaccines Aged Out    Hepatitis A Vaccines Aged Out    Meningococcal Vaccine Aged Out    Rotavirus Vaccines Aged Out    HPV Vaccines Aged Out           Social History     Tobacco Use   Smoking Status Never   Smokeless Tobacco Never       Alcohol Use: Not At Risk (4/26/2024)    AUDIT-C     Frequency of Alcohol Consumption: Monthly or less     Average Number of Drinks: 1 or 2  "    Frequency of Binge Drinking: Never         Objective        Vitals:    04/26/24 1013   BP: 132/58   BP Location: Left arm   Patient Position: Sitting   BP Cuff Size: Adult   Pulse: 62   Temp: 36.2 °C (97.2 °F)   TempSrc: Temporal   SpO2: 97%   Weight: 71.8 kg (158 lb 6.4 oz)   Height: 1.638 m (5' 4.5\")           Patient is alert and oriented x 3 , NAD  HEAD- normocephalic and atraumatic   EYES-conjunctiva-normal, lids - normal  EARS/NOSE- normal external exam   NECK-supple,FROM  CV- RRR without murmur  PULM- CTA bilaterally, normal respiratory effort  RESPIRATORY EFFORT- normal , no retractions or nasal flaring   ABD- normoactive BS's   EXT- no edema,NT  SKIN- no abnormal skin lesions noted  NEURO- no focal deficits  PSYCH- pleasant, normal judgement and insight      BP Readings from Last 3 Encounters:   04/26/24 132/58   10/24/23 120/76   06/02/23 151/69           Wt Readings from Last 3 Encounters:   04/26/24 71.8 kg (158 lb 6.4 oz)   10/24/23 73.5 kg (162 lb)   06/02/23 72.8 kg (160 lb 6.4 oz)         BMI Readings from Last 3 Encounters:   04/26/24 26.77 kg/m²   10/24/23 26.96 kg/m²   06/02/23 27.53 kg/m²         The number and complexity of problems addressed is considered moderate.  The amount and/or complexity of data reviewed and analyzed is considered moderate. The risk of complications and/or morbidity/mortality of patient is considered moderate. Overall, this patient encounter is considered a moderate risk visit.    Patient has no concerns with pain today.      Patient is not on any high risk medications.      Patient is compliant with their medications.    Possible side effects and cautions  All SSRIs are thought to work in a similar way and generally can cause similar side effects, though some people may not experience any. Many side effects may go away after the first few weeks of treatment, while others may lead you and your doctor to try a different drug.    If you can't tolerate one SSRI, you may " be able to tolerate a different one, as SSRIs differ in their potencies at blocking serotonin reuptake and in how quickly the body eliminates (metabolizes) the drug.    Possible side effects of SSRIs may include, among others:    Nausea, vomiting or diarrhea  Headache  Drowsiness  Dry mouth  Insomnia  Nervousness, agitation or restlessness  Dizziness  Sexual problems, such as reduced sexual desire, difficulty reaching orgasm or inability to maintain an erection (erectile dysfunction)  Impact on appetite, leading to weight loss or weight gain  Taking your medication with food may reduce the risk of nausea. Also, as long as your medication doesn't keep you from sleeping, you can reduce the impact of nausea by taking it at bedtime.    Which antidepressant is best for you depends on a number of issues, such as your symptoms and any other health conditions you may have. Ask your doctor and pharmacist about the most common possible side effects for your specific SSRI and read the patient medication guide that comes with the prescription.    Safety issues  SSRIs are generally safe for most people. However, in some circumstances they can cause problems. For example, high doses of citalopram may cause dangerous abnormal heart rhythms, so doses over 40 milligrams (mg) a day should be avoided according to the FDA and the . Issues to discuss with your doctor before you take an SSRI include:    Drug interactions. When taking an antidepressant, tell your doctor about any other prescription or over-the-counter medications, herbs or other supplements you're taking. Some antidepressants can interfere with the effectiveness of other medications, and some can cause dangerous reactions when combined with certain medications or herbal supplements.    For example, SSRIs may increase your risk of bleeding, especially when you're taking other medications that increase the risk of bleeding, such as nonsteroidal anti-inflammatory  drugs (NSAIDs), aspirin, warfarin (Coumadin, Jantoven) and other blood thinners.    Serotonin syndrome. Rarely, an antidepressant can cause high levels of serotonin to accumulate in your body. Serotonin syndrome most often occurs when two medications that raise the level of serotonin are combined. These include, for example, other antidepressants, certain pain or headache medications, and the herbal supplement Thendara's wort.    Signs and symptoms of serotonin syndrome include anxiety, agitation, high fever, sweating, confusion, tremors, restlessness, lack of coordination, major changes in blood pressure and a rapid heart rate. Seek immediate medical attention if you have any of these signs or symptoms.    Antidepressants and pregnancy. Talk to your doctor about the risks and benefits of using specific antidepressants. Some antidepressants may harm your baby if you take them during pregnancy or while you're breast-feeding. If you're taking an antidepressant and you're considering getting pregnant, talk to your doctor about the possible risks. Don't stop taking your medication without contacting your doctor first, as stopping might pose risks for you.  Suicide risk and antidepressants  Most antidepressants are generally safe, but the FDA requires that all antidepressants carry black box warnings, the strictest warnings for prescriptions. In some cases, children, teenagers and young adults under 25 may have an increase in suicidal thoughts or behavior when taking antidepressants, especially in the first few weeks after starting or when the dose is changed.        Assessment/Plan            Problem List Items Addressed This Visit          Active Problems    Allergic rhinitis (Chronic)    Depression, major, recurrent, mild (CMS-HCC) (Chronic)    Hypercholesterolemia (Chronic)    Relevant Orders    Hepatic Function Panel    Lipid Panel    Follow Up In [facility] - PCP - Established    Hypertension (Chronic)     Relevant Orders    Basic Metabolic Panel    Insomnia (Chronic)    Medicare annual wellness visit, subsequent - Primary (Chronic)    Mild anxiety (Chronic)    Renal insufficiency (Chronic)    Screening for multiple conditions (Chronic)    Stage 3a chronic kidney disease (Multi) (Chronic)    Type 2 diabetes mellitus with microalbuminuria, without long-term current use of insulin (Multi) (Chronic)    Relevant Orders    Hemoglobin A1C    Vitamin D deficiency (Chronic)             Orders Placed This Encounter   Procedures    Hepatic Function Panel    Lipid Panel    Basic Metabolic Panel    Hemoglobin A1C         Continue medications      Ordered lab      Follow up in 6 months            Recommendations for women annual wellness exam:   Make sure screenings for cervical and breast cancer are up to date if applicable- pap smears age 21-65  mammogram starting at age 35- 40 or sooner if positive family history of breast cancer   colonoscopy at age 45, earlier if positive family history for breast or colon cancer   Screen for osteoporosis with DXA bone scan starting at age 65 or sooner if risk factors present (long term steroid use, smoking, heavy alcohol use, history of fracture, rheumatoid arthritis, low body weight, family history of hip fracture)  Screening for lung cancer with low-dose CT in all adults age 50-80 who have a 20 pack-year smoking history and currently smoke or have quit within the past 15 years; and are symptom free/no prior pulmonary diagnosis  Gardisil vaccine age 9-26 years of age   Follow a healthy diet (Examples, Dash diet, Mediterranean diet)  Exercise 150 minutes per week   Maintain healthy weight (BMI < 25)  Do not smoke   Alcohol in moderation (up to 1 drink/day)  Get enough sleep (7-8 hours/night)  Take a prenatal vitamin with folic acid if possibility of pregnancy   Make sure immunizations are up to date   Recommend minimum 1,000 mg calcium and 600-800 IU vitamin D daily (combination of diet +  supplement)- unless there is a contraindication   Visit dentist twice yearly      If you are less than 60 years old, have diabetes mellitus, or chronic kidney disease, your goal blood pressure is < 140/90.  If you are older than 60 years old and do not have diabetes or kidney disease, your goal blood pressure is < 150/90.

## 2024-04-26 ENCOUNTER — OFFICE VISIT (OUTPATIENT)
Dept: PRIMARY CARE | Facility: CLINIC | Age: 69
End: 2024-04-26
Payer: MEDICARE

## 2024-04-26 ENCOUNTER — LAB (OUTPATIENT)
Dept: LAB | Facility: LAB | Age: 69
End: 2024-04-26
Payer: MEDICARE

## 2024-04-26 VITALS
HEIGHT: 65 IN | SYSTOLIC BLOOD PRESSURE: 132 MMHG | HEART RATE: 62 BPM | OXYGEN SATURATION: 97 % | BODY MASS INDEX: 26.39 KG/M2 | DIASTOLIC BLOOD PRESSURE: 58 MMHG | WEIGHT: 158.4 LBS | TEMPERATURE: 97.2 F

## 2024-04-26 DIAGNOSIS — E11.29 TYPE 2 DIABETES MELLITUS WITH MICROALBUMINURIA, WITHOUT LONG-TERM CURRENT USE OF INSULIN (MULTI): Chronic | ICD-10-CM

## 2024-04-26 DIAGNOSIS — J30.9 ALLERGIC RHINITIS, UNSPECIFIED SEASONALITY, UNSPECIFIED TRIGGER: Chronic | ICD-10-CM

## 2024-04-26 DIAGNOSIS — E78.00 HYPERCHOLESTEROLEMIA: Chronic | ICD-10-CM

## 2024-04-26 DIAGNOSIS — N28.9 RENAL INSUFFICIENCY: Chronic | ICD-10-CM

## 2024-04-26 DIAGNOSIS — E55.9 VITAMIN D DEFICIENCY: Chronic | ICD-10-CM

## 2024-04-26 DIAGNOSIS — Z00.00 MEDICARE ANNUAL WELLNESS VISIT, SUBSEQUENT: Primary | Chronic | ICD-10-CM

## 2024-04-26 DIAGNOSIS — R80.9 TYPE 2 DIABETES MELLITUS WITH MICROALBUMINURIA, WITHOUT LONG-TERM CURRENT USE OF INSULIN (MULTI): Chronic | ICD-10-CM

## 2024-04-26 DIAGNOSIS — G47.00 INSOMNIA, UNSPECIFIED TYPE: Chronic | ICD-10-CM

## 2024-04-26 DIAGNOSIS — I10 PRIMARY HYPERTENSION: Chronic | ICD-10-CM

## 2024-04-26 DIAGNOSIS — F41.9 MILD ANXIETY: Chronic | ICD-10-CM

## 2024-04-26 DIAGNOSIS — Z13.89 SCREENING FOR MULTIPLE CONDITIONS: Chronic | ICD-10-CM

## 2024-04-26 DIAGNOSIS — N18.31 STAGE 3A CHRONIC KIDNEY DISEASE (MULTI): Chronic | ICD-10-CM

## 2024-04-26 DIAGNOSIS — F33.0 DEPRESSION, MAJOR, RECURRENT, MILD (CMS-HCC): Chronic | ICD-10-CM

## 2024-04-26 PROCEDURE — 1123F ACP DISCUSS/DSCN MKR DOCD: CPT | Performed by: FAMILY MEDICINE

## 2024-04-26 PROCEDURE — 83036 HEMOGLOBIN GLYCOSYLATED A1C: CPT

## 2024-04-26 PROCEDURE — 3075F SYST BP GE 130 - 139MM HG: CPT | Performed by: FAMILY MEDICINE

## 2024-04-26 PROCEDURE — 80053 COMPREHEN METABOLIC PANEL: CPT

## 2024-04-26 PROCEDURE — 4010F ACE/ARB THERAPY RXD/TAKEN: CPT | Performed by: FAMILY MEDICINE

## 2024-04-26 PROCEDURE — 99214 OFFICE O/P EST MOD 30 MIN: CPT | Performed by: FAMILY MEDICINE

## 2024-04-26 PROCEDURE — 36415 COLL VENOUS BLD VENIPUNCTURE: CPT

## 2024-04-26 PROCEDURE — 1159F MED LIST DOCD IN RCRD: CPT | Performed by: FAMILY MEDICINE

## 2024-04-26 PROCEDURE — G0439 PPPS, SUBSEQ VISIT: HCPCS | Performed by: FAMILY MEDICINE

## 2024-04-26 PROCEDURE — 1036F TOBACCO NON-USER: CPT | Performed by: FAMILY MEDICINE

## 2024-04-26 PROCEDURE — 82248 BILIRUBIN DIRECT: CPT

## 2024-04-26 PROCEDURE — 80061 LIPID PANEL: CPT

## 2024-04-26 PROCEDURE — 1170F FXNL STATUS ASSESSED: CPT | Performed by: FAMILY MEDICINE

## 2024-04-26 PROCEDURE — 3078F DIAST BP <80 MM HG: CPT | Performed by: FAMILY MEDICINE

## 2024-04-26 PROCEDURE — 1160F RVW MEDS BY RX/DR IN RCRD: CPT | Performed by: FAMILY MEDICINE

## 2024-04-26 ASSESSMENT — PATIENT HEALTH QUESTIONNAIRE - PHQ9
5. POOR APPETITE OR OVEREATING: SEVERAL DAYS
2. FEELING DOWN, DEPRESSED OR HOPELESS: SEVERAL DAYS
8. MOVING OR SPEAKING SO SLOWLY THAT OTHER PEOPLE COULD HAVE NOTICED. OR THE OPPOSITE, BEING SO FIGETY OR RESTLESS THAT YOU HAVE BEEN MOVING AROUND A LOT MORE THAN USUAL: NOT AT ALL
SUM OF ALL RESPONSES TO PHQ9 QUESTIONS 1 AND 2: 2
SUM OF ALL RESPONSES TO PHQ QUESTIONS 1-9: 7
9. THOUGHTS THAT YOU WOULD BE BETTER OFF DEAD, OR OF HURTING YOURSELF: NOT AT ALL
10. IF YOU CHECKED OFF ANY PROBLEMS, HOW DIFFICULT HAVE THESE PROBLEMS MADE IT FOR YOU TO DO YOUR WORK, TAKE CARE OF THINGS AT HOME, OR GET ALONG WITH OTHER PEOPLE: SOMEWHAT DIFFICULT
4. FEELING TIRED OR HAVING LITTLE ENERGY: SEVERAL DAYS
7. TROUBLE CONCENTRATING ON THINGS, SUCH AS READING THE NEWSPAPER OR WATCHING TELEVISION: SEVERAL DAYS
6. FEELING BAD ABOUT YOURSELF - OR THAT YOU ARE A FAILURE OR HAVE LET YOURSELF OR YOUR FAMILY DOWN: SEVERAL DAYS
3. TROUBLE FALLING OR STAYING ASLEEP OR SLEEPING TOO MUCH: SEVERAL DAYS
1. LITTLE INTEREST OR PLEASURE IN DOING THINGS: SEVERAL DAYS

## 2024-04-26 ASSESSMENT — LIFESTYLE VARIABLES
HOW OFTEN DURING THE LAST YEAR HAVE YOU NEEDED AN ALCOHOLIC DRINK FIRST THING IN THE MORNING TO GET YOURSELF GOING AFTER A NIGHT OF HEAVY DRINKING: NEVER
HAS A RELATIVE, FRIEND, DOCTOR, OR ANOTHER HEALTH PROFESSIONAL EXPRESSED CONCERN ABOUT YOUR DRINKING OR SUGGESTED YOU CUT DOWN: NO
HAVE YOU OR SOMEONE ELSE BEEN INJURED AS A RESULT OF YOUR DRINKING: NO
HOW OFTEN DURING THE LAST YEAR HAVE YOU BEEN UNABLE TO REMEMBER WHAT HAPPENED THE NIGHT BEFORE BECAUSE YOU HAD BEEN DRINKING: NEVER
HOW OFTEN DURING THE LAST YEAR HAVE YOU FAILED TO DO WHAT WAS NORMALLY EXPECTED FROM YOU BECAUSE OF DRINKING: NEVER
HOW MANY STANDARD DRINKS CONTAINING ALCOHOL DO YOU HAVE ON A TYPICAL DAY: 1 OR 2
AUDIT-C TOTAL SCORE: 1
AUDIT TOTAL SCORE: 1
HOW OFTEN DURING THE LAST YEAR HAVE YOU FOUND THAT YOU WERE NOT ABLE TO STOP DRINKING ONCE YOU HAD STARTED: NEVER
HOW OFTEN DO YOU HAVE SIX OR MORE DRINKS ON ONE OCCASION: NEVER
SKIP TO QUESTIONS 9-10: 1
HOW OFTEN DURING THE LAST YEAR HAVE YOU HAD A FEELING OF GUILT OR REMORSE AFTER DRINKING: NEVER
HOW OFTEN DO YOU HAVE A DRINK CONTAINING ALCOHOL: MONTHLY OR LESS

## 2024-04-26 ASSESSMENT — ANXIETY QUESTIONNAIRES
1. FEELING NERVOUS, ANXIOUS, OR ON EDGE: SEVERAL DAYS
3. WORRYING TOO MUCH ABOUT DIFFERENT THINGS: SEVERAL DAYS
IF YOU CHECKED OFF ANY PROBLEMS ON THIS QUESTIONNAIRE, HOW DIFFICULT HAVE THESE PROBLEMS MADE IT FOR YOU TO DO YOUR WORK, TAKE CARE OF THINGS AT HOME, OR GET ALONG WITH OTHER PEOPLE: SOMEWHAT DIFFICULT
2. NOT BEING ABLE TO STOP OR CONTROL WORRYING: SEVERAL DAYS
4. TROUBLE RELAXING: SEVERAL DAYS
GAD7 TOTAL SCORE: 6
7. FEELING AFRAID AS IF SOMETHING AWFUL MIGHT HAPPEN: NOT AT ALL
6. BECOMING EASILY ANNOYED OR IRRITABLE: SEVERAL DAYS
5. BEING SO RESTLESS THAT IT IS HARD TO SIT STILL: SEVERAL DAYS

## 2024-04-26 ASSESSMENT — ACTIVITIES OF DAILY LIVING (ADL)
DRESSING: INDEPENDENT
GROCERY_SHOPPING: INDEPENDENT
TAKING_MEDICATION: INDEPENDENT
BATHING: INDEPENDENT
DOING_HOUSEWORK: INDEPENDENT
MANAGING_FINANCES: INDEPENDENT

## 2024-04-27 LAB
ALBUMIN SERPL BCP-MCNC: 4.6 G/DL (ref 3.4–5)
ALP SERPL-CCNC: 133 U/L (ref 33–136)
ALT SERPL W P-5'-P-CCNC: 130 U/L (ref 7–45)
ANION GAP SERPL CALC-SCNC: 12 MMOL/L (ref 10–20)
AST SERPL W P-5'-P-CCNC: 86 U/L (ref 9–39)
BILIRUB DIRECT SERPL-MCNC: 0.1 MG/DL (ref 0–0.3)
BILIRUB SERPL-MCNC: 0.4 MG/DL (ref 0–1.2)
BUN SERPL-MCNC: 14 MG/DL (ref 6–23)
CALCIUM SERPL-MCNC: 9.8 MG/DL (ref 8.6–10.6)
CHLORIDE SERPL-SCNC: 103 MMOL/L (ref 98–107)
CHOLEST SERPL-MCNC: 144 MG/DL (ref 0–199)
CHOLESTEROL/HDL RATIO: 3.2
CO2 SERPL-SCNC: 28 MMOL/L (ref 21–32)
CREAT SERPL-MCNC: 1.1 MG/DL (ref 0.5–1.05)
EGFRCR SERPLBLD CKD-EPI 2021: 55 ML/MIN/1.73M*2
EST. AVERAGE GLUCOSE BLD GHB EST-MCNC: 163 MG/DL
GLUCOSE SERPL-MCNC: 126 MG/DL (ref 74–99)
HBA1C MFR BLD: 7.3 %
HDLC SERPL-MCNC: 44.5 MG/DL
LDLC SERPL CALC-MCNC: 53 MG/DL
NON HDL CHOLESTEROL: 100 MG/DL (ref 0–149)
POTASSIUM SERPL-SCNC: 4.2 MMOL/L (ref 3.5–5.3)
PROT SERPL-MCNC: 7.5 G/DL (ref 6.4–8.2)
SODIUM SERPL-SCNC: 139 MMOL/L (ref 136–145)
TRIGL SERPL-MCNC: 235 MG/DL (ref 0–149)
VLDL: 47 MG/DL (ref 0–40)

## 2024-04-28 DIAGNOSIS — R79.89 ELEVATED LIVER FUNCTION TESTS: Primary | Chronic | ICD-10-CM

## 2024-05-01 RX ORDER — METFORMIN HYDROCHLORIDE 500 MG/1
1500 TABLET, EXTENDED RELEASE ORAL
COMMUNITY

## 2024-05-16 DIAGNOSIS — N18.31 STAGE 3A CHRONIC KIDNEY DISEASE (MULTI): Chronic | ICD-10-CM

## 2024-05-20 ENCOUNTER — LAB (OUTPATIENT)
Dept: LAB | Facility: LAB | Age: 69
End: 2024-05-20
Payer: MEDICARE

## 2024-05-20 DIAGNOSIS — N18.31 STAGE 3A CHRONIC KIDNEY DISEASE (MULTI): ICD-10-CM

## 2024-05-20 DIAGNOSIS — R79.89 ELEVATED LIVER FUNCTION TESTS: Chronic | ICD-10-CM

## 2024-05-20 LAB
ALBUMIN SERPL BCP-MCNC: 4.3 G/DL (ref 3.4–5)
ALP SERPL-CCNC: 110 U/L (ref 33–136)
ALT SERPL W P-5'-P-CCNC: 48 U/L (ref 7–45)
ANION GAP SERPL CALC-SCNC: 16 MMOL/L (ref 10–20)
APPEARANCE UR: CLEAR
AST SERPL W P-5'-P-CCNC: 34 U/L (ref 9–39)
BACTERIA #/AREA URNS AUTO: ABNORMAL /HPF
BILIRUB DIRECT SERPL-MCNC: 0.1 MG/DL (ref 0–0.3)
BILIRUB SERPL-MCNC: 0.5 MG/DL (ref 0–1.2)
BILIRUB UR STRIP.AUTO-MCNC: NEGATIVE MG/DL
BUN SERPL-MCNC: 20 MG/DL (ref 6–23)
CALCIUM SERPL-MCNC: 9.7 MG/DL (ref 8.6–10.6)
CHLORIDE SERPL-SCNC: 102 MMOL/L (ref 98–107)
CO2 SERPL-SCNC: 26 MMOL/L (ref 21–32)
COLOR UR: YELLOW
CREAT SERPL-MCNC: 1.08 MG/DL (ref 0.5–1.05)
CREAT UR-MCNC: 78.5 MG/DL (ref 20–320)
EGFRCR SERPLBLD CKD-EPI 2021: 56 ML/MIN/1.73M*2
GLUCOSE SERPL-MCNC: 149 MG/DL (ref 74–99)
GLUCOSE UR STRIP.AUTO-MCNC: NORMAL MG/DL
KETONES UR STRIP.AUTO-MCNC: NEGATIVE MG/DL
LEUKOCYTE ESTERASE UR QL STRIP.AUTO: ABNORMAL
MICROALBUMIN UR-MCNC: 151.1 MG/L
MICROALBUMIN/CREAT UR: 192.5 UG/MG CREAT
MUCOUS THREADS #/AREA URNS AUTO: ABNORMAL /LPF
NITRITE UR QL STRIP.AUTO: NEGATIVE
PH UR STRIP.AUTO: 5.5 [PH]
POTASSIUM SERPL-SCNC: 4.2 MMOL/L (ref 3.5–5.3)
PROT SERPL-MCNC: 7.3 G/DL (ref 6.4–8.2)
PROT UR STRIP.AUTO-MCNC: ABNORMAL MG/DL
RBC # UR STRIP.AUTO: NEGATIVE /UL
RBC #/AREA URNS AUTO: ABNORMAL /HPF
SODIUM SERPL-SCNC: 140 MMOL/L (ref 136–145)
SP GR UR STRIP.AUTO: 1.01
SQUAMOUS #/AREA URNS AUTO: ABNORMAL /HPF
UROBILINOGEN UR STRIP.AUTO-MCNC: NORMAL MG/DL
WBC #/AREA URNS AUTO: ABNORMAL /HPF

## 2024-05-20 PROCEDURE — 80053 COMPREHEN METABOLIC PANEL: CPT

## 2024-05-20 PROCEDURE — 36415 COLL VENOUS BLD VENIPUNCTURE: CPT

## 2024-05-20 PROCEDURE — 81001 URINALYSIS AUTO W/SCOPE: CPT

## 2024-05-20 PROCEDURE — 82570 ASSAY OF URINE CREATININE: CPT

## 2024-05-20 PROCEDURE — 82043 UR ALBUMIN QUANTITATIVE: CPT

## 2024-05-20 PROCEDURE — 82248 BILIRUBIN DIRECT: CPT

## 2024-05-22 ENCOUNTER — OFFICE VISIT (OUTPATIENT)
Dept: NEPHROLOGY | Facility: CLINIC | Age: 69
End: 2024-05-22
Payer: MEDICARE

## 2024-05-22 VITALS
HEART RATE: 53 BPM | DIASTOLIC BLOOD PRESSURE: 66 MMHG | SYSTOLIC BLOOD PRESSURE: 122 MMHG | WEIGHT: 158.6 LBS | BODY MASS INDEX: 26.42 KG/M2 | HEIGHT: 65 IN

## 2024-05-22 DIAGNOSIS — E11.29 TYPE 2 DIABETES MELLITUS WITH MICROALBUMINURIA (MULTI): Chronic | ICD-10-CM

## 2024-05-22 DIAGNOSIS — I10 PRIMARY HYPERTENSION: Chronic | ICD-10-CM

## 2024-05-22 DIAGNOSIS — N18.31 STAGE 3A CHRONIC KIDNEY DISEASE (MULTI): Primary | Chronic | ICD-10-CM

## 2024-05-22 DIAGNOSIS — E78.00 HYPERCHOLESTEROLEMIA: Chronic | ICD-10-CM

## 2024-05-22 DIAGNOSIS — E21.3 HYPERPARATHYROIDISM (MULTI): ICD-10-CM

## 2024-05-22 DIAGNOSIS — R80.9 TYPE 2 DIABETES MELLITUS WITH MICROALBUMINURIA (MULTI): Chronic | ICD-10-CM

## 2024-05-22 PROCEDURE — 1159F MED LIST DOCD IN RCRD: CPT | Performed by: CLINICAL NURSE SPECIALIST

## 2024-05-22 PROCEDURE — 4010F ACE/ARB THERAPY RXD/TAKEN: CPT | Performed by: CLINICAL NURSE SPECIALIST

## 2024-05-22 PROCEDURE — 1123F ACP DISCUSS/DSCN MKR DOCD: CPT | Performed by: CLINICAL NURSE SPECIALIST

## 2024-05-22 PROCEDURE — 1036F TOBACCO NON-USER: CPT | Performed by: CLINICAL NURSE SPECIALIST

## 2024-05-22 PROCEDURE — 3051F HG A1C>EQUAL 7.0%<8.0%: CPT | Performed by: CLINICAL NURSE SPECIALIST

## 2024-05-22 PROCEDURE — 3078F DIAST BP <80 MM HG: CPT | Performed by: CLINICAL NURSE SPECIALIST

## 2024-05-22 PROCEDURE — 99213 OFFICE O/P EST LOW 20 MIN: CPT | Performed by: CLINICAL NURSE SPECIALIST

## 2024-05-22 PROCEDURE — 1160F RVW MEDS BY RX/DR IN RCRD: CPT | Performed by: CLINICAL NURSE SPECIALIST

## 2024-05-22 PROCEDURE — 3048F LDL-C <100 MG/DL: CPT | Performed by: CLINICAL NURSE SPECIALIST

## 2024-05-22 PROCEDURE — 3060F POS MICROALBUMINURIA REV: CPT | Performed by: CLINICAL NURSE SPECIALIST

## 2024-05-22 PROCEDURE — 3074F SYST BP LT 130 MM HG: CPT | Performed by: CLINICAL NURSE SPECIALIST

## 2024-05-22 RX ORDER — ASPIRIN 81 MG/1
81 TABLET ORAL DAILY
COMMUNITY

## 2024-05-22 RX ORDER — DAPAGLIFLOZIN 10 MG/1
10 TABLET, FILM COATED ORAL DAILY
Qty: 30 TABLET | Refills: 11 | Status: SHIPPED | OUTPATIENT
Start: 2024-05-22 | End: 2024-05-22

## 2024-05-22 RX ORDER — DAPAGLIFLOZIN 10 MG/1
10 TABLET, FILM COATED ORAL DAILY
Qty: 30 TABLET | Refills: 11 | Status: SHIPPED | OUTPATIENT
Start: 2024-05-22 | End: 2025-05-22

## 2024-05-22 ASSESSMENT — ENCOUNTER SYMPTOMS
ENDOCRINE NEGATIVE: 1
CONSTITUTIONAL NEGATIVE: 1
RESPIRATORY NEGATIVE: 1
MUSCULOSKELETAL NEGATIVE: 1
CARDIOVASCULAR NEGATIVE: 1
GASTROINTESTINAL NEGATIVE: 1
PSYCHIATRIC NEGATIVE: 1
NEUROLOGICAL NEGATIVE: 1

## 2024-05-22 NOTE — PROGRESS NOTES
"Subjective   Patient ID: Dara Shoko \"Le" is a 68 y.o. female who presents for No chief complaint on file..  Patient being seen in follow-up for chronic kidney disease stage III with history of diabetes and hypertension    Labs reviewed  Urinalysis essentially normal with positive leukoesterase, microscopic shows 6-10 WBCs and 1+ bacteria  Albumin creatinine ratio 192.5  Glucose 149  Sodium 140, potassium 4.2, chloride 102, bicarb 26  Renal function with BUN of 20 and creatinine of 1.08, GFR is 56  Last hemoglobin A1c 7.3    She is doing well  Her last office visit with her family practitioner her metformin was increased due to the increase in her hemoglobin A1c  She has no complaints  She is voiding without difficulties  She has no edema  Her blood pressure is well-controlled            Review of Systems   Constitutional: Negative.    Respiratory: Negative.     Cardiovascular: Negative.    Gastrointestinal: Negative.    Endocrine: Negative.    Genitourinary: Negative.    Musculoskeletal: Negative.    Skin: Negative.    Neurological: Negative.    Psychiatric/Behavioral: Negative.         Objective   Physical Exam  Vitals reviewed.   Constitutional:       Appearance: Normal appearance.   HENT:      Head: Normocephalic.   Cardiovascular:      Rate and Rhythm: Normal rate and regular rhythm.   Pulmonary:      Effort: Pulmonary effort is normal.      Breath sounds: Normal breath sounds.   Abdominal:      Palpations: Abdomen is soft.   Musculoskeletal:         General: Normal range of motion.   Skin:     General: Skin is warm and dry.   Neurological:      Mental Status: She is alert and oriented to person, place, and time.   Psychiatric:         Mood and Affect: Mood normal.         Behavior: Behavior normal.       Assessment/Plan   Problem List Items Addressed This Visit             ICD-10-CM    Hypertension (Chronic) I10     Blood pressure is well-controlled on amlodipine and carvedilol, will continue with " current medications         Type 2 diabetes mellitus with microalbuminuria (Multi) (Chronic) E11.29, R80.9     Hemoglobin A1c was 7.3, metformin was increased to 1500 mg/day, we have attempted to use SGLT2's in the past, these have been cost prohibitive for her, will attempt again to start Farxiga, I did let her know that if it was cost prohibitive that we would try once a year until she would be able to afford it         Relevant Medications    Farxiga 10 mg    Hypercholesterolemia (Chronic) E78.00    Hyperparathyroidism (Multi) (Chronic) E21.3    Stage 3a chronic kidney disease (Multi) - Primary (Chronic) N18.31     Renal function is stable with creatinine of 1.08, GFR is 56, blood pressure is well-controlled, diabetes fairly well-controlled, last hemoglobin A1c 7.3, metformin was increased, will attempt to start SGLT2, not using nephrotoxic medications         Relevant Medications    Farxiga 10 mg    Other Relevant Orders    Basic metabolic panel    Urinalysis with Reflex Microscopic    Albumin , Urine Random    Follow Up In Nephrology     Chronic kidney disease stage III with baseline creatinine 1-1.2  Diabetes mellitus type 2  Hypertension  History of hypercalcemia  Proteinuria of 0.74  Recurrent UTIs  Bilateral renal simple cyst       LEANN Powell-CNP, CLAUDE 05/22/24 10:01 AM

## 2024-05-22 NOTE — ASSESSMENT & PLAN NOTE
Hemoglobin A1c was 7.3, metformin was increased to 1500 mg/day, we have attempted to use SGLT2's in the past, these have been cost prohibitive for her, will attempt again to start Farxiga, I did let her know that if it was cost prohibitive that we would try once a year until she would be able to afford it

## 2024-05-22 NOTE — ASSESSMENT & PLAN NOTE
Blood pressure is well-controlled on amlodipine and carvedilol, will continue with current medications

## 2024-05-22 NOTE — ASSESSMENT & PLAN NOTE
Renal function is stable with creatinine of 1.08, GFR is 56, blood pressure is well-controlled, diabetes fairly well-controlled, last hemoglobin A1c 7.3, metformin was increased, will attempt to start SGLT2, not using nephrotoxic medications

## 2024-07-03 DIAGNOSIS — I10 PRIMARY HYPERTENSION: ICD-10-CM

## 2024-07-07 RX ORDER — CARVEDILOL 6.25 MG/1
6.25 TABLET ORAL 2 TIMES DAILY
Qty: 180 TABLET | Refills: 3 | Status: SHIPPED | OUTPATIENT
Start: 2024-07-07 | End: 2025-07-07

## 2024-07-16 DIAGNOSIS — I10 PRIMARY HYPERTENSION: ICD-10-CM

## 2024-07-16 RX ORDER — LISINOPRIL 40 MG/1
TABLET ORAL
Qty: 90 TABLET | Refills: 3 | Status: SHIPPED | OUTPATIENT
Start: 2024-07-16

## 2024-08-29 DIAGNOSIS — F33.0 DEPRESSION, MAJOR, RECURRENT, MILD (CMS-HCC): ICD-10-CM

## 2024-08-29 RX ORDER — SERTRALINE HYDROCHLORIDE 100 MG/1
100 TABLET, FILM COATED ORAL DAILY
Qty: 90 TABLET | Refills: 3 | Status: SHIPPED | OUTPATIENT
Start: 2024-08-29

## 2024-10-03 DIAGNOSIS — F33.2 MODERATELY SEVERE RECURRENT MAJOR DEPRESSION (MULTI): ICD-10-CM

## 2024-10-03 DIAGNOSIS — F33.0 DEPRESSION, MAJOR, RECURRENT, MILD (CMS-HCC): ICD-10-CM

## 2024-10-04 RX ORDER — BUPROPION HCL 300 MG
300 TABLET, EXTENDED RELEASE 24 HR ORAL DAILY
Qty: 90 TABLET | Refills: 3 | Status: SHIPPED | OUTPATIENT
Start: 2024-10-04

## 2024-10-24 PROBLEM — E83.52 HYPERCALCEMIA: Chronic | Status: ACTIVE | Noted: 2023-04-19

## 2024-10-24 PROBLEM — F33.40 DEPRESSION, MAJOR, RECURRENT, IN REMISSION (CMS-HCC): Chronic | Status: ACTIVE | Noted: 2023-04-19

## 2024-10-24 PROBLEM — R80.9 TYPE 2 DIABETES MELLITUS WITH MICROALBUMINURIA (MULTI): Chronic | Status: RESOLVED | Noted: 2023-03-31 | Resolved: 2024-10-24

## 2024-10-24 PROBLEM — M67.442 DIGITAL MUCOUS CYST OF LEFT HAND: Status: RESOLVED | Noted: 2023-05-31 | Resolved: 2024-10-24

## 2024-10-24 PROBLEM — E11.29 TYPE 2 DIABETES MELLITUS WITH MICROALBUMINURIA (MULTI): Chronic | Status: RESOLVED | Noted: 2023-03-31 | Resolved: 2024-10-24

## 2024-10-24 PROBLEM — N28.9 RENAL INSUFFICIENCY: Chronic | Status: RESOLVED | Noted: 2023-04-19 | Resolved: 2024-10-24

## 2024-10-24 PROBLEM — F33.40 DEPRESSION, MAJOR, RECURRENT, IN REMISSION (CMS-HCC): Chronic | Status: ACTIVE | Noted: 2023-03-30

## 2024-10-25 NOTE — ASSESSMENT & PLAN NOTE
Rosuvastatin  Fish oil   Orders:    Hepatic Function Panel; Future    Lipid Panel; Future    Follow Up In [facility] - PCP - Established    Follow Up In [facility] - PCP - Established; Future

## 2024-10-25 NOTE — PROGRESS NOTES
Subjective        Dara Shook. Is 69 y.o.. female. Here for follow up office visit-       Chief Complaint   Patient presents with    Follow-up       HPI:    Pt would like flu vaccine        Follow up for BP, DM, CKD, anx, dep , allergies, insomnia, vit d , chol          Pt is doing well        Due for lab         Pt is retired           Bus trip to [place]  and [place]             Lab on 05/20/2024   Component Date Value Ref Range Status    Albumin 05/20/2024 4.3  3.4 - 5.0 g/dL Final    Bilirubin, Total 05/20/2024 0.5  0.0 - 1.2 mg/dL Final    Bilirubin, Direct 05/20/2024 0.1  0.0 - 0.3 mg/dL Final    Alkaline Phosphatase 05/20/2024 110  33 - 136 U/L Final    ALT 05/20/2024 48 (H)  7 - 45 U/L Final    Patients treated with Sulfasalazine may generate falsely decreased results for ALT.    AST 05/20/2024 34  9 - 39 U/L Final    Total Protein 05/20/2024 7.3  6.4 - 8.2 g/dL Final    Glucose 05/20/2024 149 (H)  74 - 99 mg/dL Final    Sodium 05/20/2024 140  136 - 145 mmol/L Final    Potassium 05/20/2024 4.2  3.5 - 5.3 mmol/L Final    Chloride 05/20/2024 102  98 - 107 mmol/L Final    Bicarbonate 05/20/2024 26  21 - 32 mmol/L Final    Anion Gap 05/20/2024 16  10 - 20 mmol/L Final    Urea Nitrogen 05/20/2024 20  6 - 23 mg/dL Final    Creatinine 05/20/2024 1.08 (H)  0.50 - 1.05 mg/dL Final    eGFR 05/20/2024 56 (L)  >60 mL/min/1.73m*2 Final    Calculations of estimated GFR are performed using the 2021 CKD-EPI Study Refit equation without the race variable for the IDMS-Traceable creatinine methods.  https://jasn.asnjournals.org/content/early/2021/09/22/ASN.9418654048    Calcium 05/20/2024 9.7  8.6 - 10.6 mg/dL Final    Albumin, Urine Random 05/20/2024 151.1  Not established mg/L Final    Creatinine, Urine Random 05/20/2024 78.5  20.0 - 320.0 mg/dL Final    Albumin/Creatinine Ratio 05/20/2024 192.5 (H)  <30.0 ug/mg Creat Final    Color, Urine 05/20/2024 Yellow  Light-Yellow, Yellow, Dark-Yellow Final    Appearance, Urine  05/20/2024 Clear  Clear Final    Specific Gravity, Urine 05/20/2024 1.015  1.005 - 1.035 Final    pH, Urine 05/20/2024 5.5  5.0, 5.5, 6.0, 6.5, 7.0, 7.5, 8.0 Final    Protein, Urine 05/20/2024 10 (TRACE)  NEGATIVE, 10 (TRACE), 20 (TRACE) mg/dL Final    Glucose, Urine 05/20/2024 Normal  Normal mg/dL Final    Blood, Urine 05/20/2024 NEGATIVE  NEGATIVE Final    Ketones, Urine 05/20/2024 NEGATIVE  NEGATIVE mg/dL Final    Bilirubin, Urine 05/20/2024 NEGATIVE  NEGATIVE Final    Urobilinogen, Urine 05/20/2024 Normal  Normal mg/dL Final    Nitrite, Urine 05/20/2024 NEGATIVE  NEGATIVE Final    Leukocyte Esterase, Urine 05/20/2024 500 Martha/µL (A)  NEGATIVE Final    WBC, Urine 05/20/2024 6-10 (A)  1-5, NONE /HPF Final    RBC, Urine 05/20/2024 1-2  NONE, 1-2, 3-5 /HPF Final    Squamous Epithelial Cells, Urine 05/20/2024 1-9 (SPARSE)  Reference range not established. /HPF Final    Bacteria, Urine 05/20/2024 1+ (A)  NONE SEEN /HPF Final    Mucus, Urine 05/20/2024 FEW  Reference range not established. /LPF Final   Lab on 04/26/2024   Component Date Value Ref Range Status    Albumin 04/26/2024 4.6  3.4 - 5.0 g/dL Final    Bilirubin, Total 04/26/2024 0.4  0.0 - 1.2 mg/dL Final    Bilirubin, Direct 04/26/2024 0.1  0.0 - 0.3 mg/dL Final    Alkaline Phosphatase 04/26/2024 133  33 - 136 U/L Final    ALT 04/26/2024 130 (H)  7 - 45 U/L Final    Patients treated with Sulfasalazine may generate falsely decreased results for ALT.    AST 04/26/2024 86 (H)  9 - 39 U/L Final    Total Protein 04/26/2024 7.5  6.4 - 8.2 g/dL Final    Cholesterol 04/26/2024 144  0 - 199 mg/dL Final          Age      Desirable   Borderline High   High     0-19 Y     0 - 169       170 - 199     >/= 200    20-24 Y     0 - 189       190 - 224     >/= 225         >24 Y     0 - 199       200 - 239     >/= 240   **All ranges are based on fasting samples. Specific   therapeutic targets will vary based on patient-specific   cardiac risk.    Pediatric guidelines  reference:Pediatrics 2011, 128(S5).Adult guidelines reference: NCEP ATPIII Guidelines,QUENTIN 2001, 258:2486-97    Venipuncture immediately after or during the administration of Metamizole may lead to falsely low results. Testing should be performed immediately prior to Metamizole dosing.    HDL-Cholesterol 04/26/2024 44.5  mg/dL Final      Age       Very Low   Low     Normal    High    0-19 Y    < 35      < 40     40-45     ----  20-24 Y    ----     < 40      >45      ----        >24 Y      ----     < 40     40-60      >60      Cholesterol/HDL Ratio 04/26/2024 3.2   Final      Ref Values  Desirable  < 3.4  High Risk  > 5.0    LDL Calculated 04/26/2024 53  <=99 mg/dL Final                                Near   Borderline      AGE      Desirable  Optimal    High     High     Very High     0-19 Y     0 - 109     ---    110-129   >/= 130     ----    20-24 Y     0 - 119     ---    120-159   >/= 160     ----      >24 Y     0 -  99   100-129  130-159   160-189     >/=190      VLDL 04/26/2024 47 (H)  0 - 40 mg/dL Final    Triglycerides 04/26/2024 235 (H)  0 - 149 mg/dL Final       Age         Desirable   Borderline High   High     Very High   0 D-90 D    19 - 174         ----         ----        ----  91 D- 9 Y     0 -  74        75 -  99     >/= 100      ----    10-19 Y     0 -  89        90 - 129     >/= 130      ----    20-24 Y     0 - 114       115 - 149     >/= 150      ----         >24 Y     0 - 149       150 - 199    200- 499    >/= 500    Venipuncture immediately after or during the administration of Metamizole may lead to falsely low results. Testing should be performed immediately prior to Metamizole dosing.    Non HDL Cholesterol 04/26/2024 100  0 - 149 mg/dL Final          Age       Desirable   Borderline High   High     Very High     0-19 Y     0 - 119       120 - 144     >/= 145    >/= 160    20-24 Y     0 - 149       150 - 189     >/= 190      ----         >24 Y    30 mg/dL above LDL Cholesterol goal       "Glucose 04/26/2024 126 (H)  74 - 99 mg/dL Final    Sodium 04/26/2024 139  136 - 145 mmol/L Final    Potassium 04/26/2024 4.2  3.5 - 5.3 mmol/L Final    Chloride 04/26/2024 103  98 - 107 mmol/L Final    Bicarbonate 04/26/2024 28  21 - 32 mmol/L Final    Anion Gap 04/26/2024 12  10 - 20 mmol/L Final    Urea Nitrogen 04/26/2024 14  6 - 23 mg/dL Final    Creatinine 04/26/2024 1.10 (H)  0.50 - 1.05 mg/dL Final    eGFR 04/26/2024 55 (L)  >60 mL/min/1.73m*2 Final    Calculations of estimated GFR are performed using the 2021 CKD-EPI Study Refit equation without the race variable for the IDMS-Traceable creatinine methods.  https://jasn.asnjournals.org/content/early/2021/09/22/ASN.4045544653    Calcium 04/26/2024 9.8  8.6 - 10.6 mg/dL Final    Hemoglobin A1C 04/26/2024 7.3 (H)  see below % Final    Estimated Average Glucose 04/26/2024 163  Not Established mg/dL Final         REVIEW OF SYSTEMS:    Review of Systems         Objective      Vitals:    11/04/24 0920   BP: 122/70   BP Location: Left arm   Patient Position: Sitting   BP Cuff Size: Adult   Pulse: 63   Resp: 12   Temp: 35.5 °C (95.9 °F)   TempSrc: Temporal   SpO2: 96%   Weight: 69.9 kg (154 lb 1.6 oz)   Height: 1.626 m (5' 4\")                       PHYSICAL:      Patient is alert and oriented x 3 , NAD  HEAD- normocephalic and atraumatic   EYES-conjunctiva-normal, lids - normal  EARS/NOSE- normal external exam   NECK-supple,FROM  CV- RRR without murmur  PULM- CTA bilaterally, normal respiratory effort  RESPIRATORY EFFORT- normal , no retractions or nasal flaring   ABD- normoactive BS's   EXT- no edema,NT  SKIN- no abnormal skin lesions noted  NEURO- no focal deficits  PSYCH- pleasant, normal judgement and insight      BP Readings from Last 3 Encounters:   11/04/24 122/70   05/22/24 122/66   04/26/24 132/58             Wt Readings from Last 3 Encounters:   11/04/24 69.9 kg (154 lb 1.6 oz)   05/22/24 71.9 kg (158 lb 9.6 oz)   04/26/24 71.8 kg (158 lb 6.4 oz) "           BMI Readings from Last 3 Encounters:   11/04/24 26.45 kg/m²   05/22/24 26.39 kg/m²   04/26/24 26.77 kg/m²               The number and complexity of problems addressed is considered moderate.  The amount and/or complexity of data reviewed and analyzed is considered moderate. The risk of complications and/or morbidity/mortality of patient is considered moderate. Overall, this patient encounter is considered a moderate risk visit.    Statins:   Although side effects believed to be caused by statins can be annoying, consider the benefits of taking a statin before you decide to stop taking your medication. Remember that statin medications can reduce your risk of a heart attack or stroke, and the risk of life-threatening side effects from statins is very low.    If you have read about the potential side effects of statins, you may be more likely to blame your symptoms on the medication, whether or not they're truly caused by the drug.    Even if your side effects are frustrating, don't stop taking your statin medication for any period of time without talking to your doctor first. Your doctor may be able to come up with an alternative treatment plan that can help you lower your cholesterol without uncomfortable side effects.    Side effects that may occur include muscle aches, elevation liver enzymes, very small incidence memory loss or confusion and increasing blood sugar.        Assessment/Plan      Assessment & Plan  Primary hypertension  Amlodipine  Coreg  Lisinopril        Hypercholesterolemia  Rosuvastatin  Fish oil   Orders:    Hepatic Function Panel; Future    Lipid Panel; Future    Follow Up In [facility] - PCP - Established    Follow Up In [facility] - PCP - Established; Future    Mild anxiety  See depression        Insomnia, unspecified type         Vitamin D deficiency  Taking supplement   Orders:    Vitamin D 25-Hydroxy,Total (for eval of Vitamin D levels); Future    Type 2  diabetes mellitus with microalbuminuria, without long-term current use of insulin (Multi)  INO Wisdomxiga   metformin         Stage 3a chronic kidney disease (Multi)  Ileana Barlow NP        Allergic rhinitis, unspecified seasonality, unspecified trigger  Loratiadine   flonase       Depression, major, recurrent, in remission (CMS-HCC)  Wellbutrin  Zoloft        Hyperparathyroidism (Multi)  Ileana Barlow NP       Encounter for administration of vaccine    Orders:    Flu vaccine, trivalent, preservative free, HIGH-DOSE, age 65y+ (Fluzone); Future              Continue medication      Ordered lab      Follow up in 6 months        Time spent during the office visit includes-    updating and familiarizing myself with patients past medical history, social history, and allergies :  discussing ROS ;  obtaining direct  chief complaint and history of present illness from patient ,  reviewing and reconciling current medication list - both prescription and over the counter medications    clinically examine patient    determine what testing if any is needed to  diagnose the condition/conditions  with which patient is presenting    using medical knowledge to put all of the information together and decide on best course of action to proceed with diagnosis  and  treatment for the presenting problem or problems      Pre-visit planning, chart review, and face-to-face encounter   Discussion of medications  Coordination with office staff for med adjustments   Final documentation of visit

## 2024-10-25 NOTE — ASSESSMENT & PLAN NOTE
Taking supplement   Orders:    Vitamin D 25-Hydroxy,Total (for eval of Vitamin D levels); Future

## 2024-11-04 ENCOUNTER — LAB (OUTPATIENT)
Dept: LAB | Facility: LAB | Age: 69
End: 2024-11-04
Payer: MEDICARE

## 2024-11-04 ENCOUNTER — APPOINTMENT (OUTPATIENT)
Dept: PRIMARY CARE | Facility: CLINIC | Age: 69
End: 2024-11-04
Payer: MEDICARE

## 2024-11-04 VITALS
DIASTOLIC BLOOD PRESSURE: 70 MMHG | BODY MASS INDEX: 26.31 KG/M2 | RESPIRATION RATE: 12 BRPM | WEIGHT: 154.1 LBS | HEIGHT: 64 IN | SYSTOLIC BLOOD PRESSURE: 122 MMHG | HEART RATE: 63 BPM | TEMPERATURE: 95.9 F | OXYGEN SATURATION: 96 %

## 2024-11-04 DIAGNOSIS — J30.9 ALLERGIC RHINITIS, UNSPECIFIED SEASONALITY, UNSPECIFIED TRIGGER: Chronic | ICD-10-CM

## 2024-11-04 DIAGNOSIS — F41.9 MILD ANXIETY: Chronic | ICD-10-CM

## 2024-11-04 DIAGNOSIS — E55.9 VITAMIN D DEFICIENCY: Chronic | ICD-10-CM

## 2024-11-04 DIAGNOSIS — N18.31 STAGE 3A CHRONIC KIDNEY DISEASE (MULTI): Chronic | ICD-10-CM

## 2024-11-04 DIAGNOSIS — G47.00 INSOMNIA, UNSPECIFIED TYPE: Chronic | ICD-10-CM

## 2024-11-04 DIAGNOSIS — I10 PRIMARY HYPERTENSION: Chronic | ICD-10-CM

## 2024-11-04 DIAGNOSIS — E21.3 HYPERPARATHYROIDISM (MULTI): Chronic | ICD-10-CM

## 2024-11-04 DIAGNOSIS — F33.40 DEPRESSION, MAJOR, RECURRENT, IN REMISSION (CMS-HCC): Chronic | ICD-10-CM

## 2024-11-04 DIAGNOSIS — R80.9 TYPE 2 DIABETES MELLITUS WITH MICROALBUMINURIA, WITHOUT LONG-TERM CURRENT USE OF INSULIN (MULTI): Chronic | ICD-10-CM

## 2024-11-04 DIAGNOSIS — E11.29 TYPE 2 DIABETES MELLITUS WITH MICROALBUMINURIA, WITHOUT LONG-TERM CURRENT USE OF INSULIN (MULTI): Chronic | ICD-10-CM

## 2024-11-04 DIAGNOSIS — E78.00 HYPERCHOLESTEROLEMIA: Chronic | ICD-10-CM

## 2024-11-04 DIAGNOSIS — Z23 ENCOUNTER FOR ADMINISTRATION OF VACCINE: Chronic | ICD-10-CM

## 2024-11-04 DIAGNOSIS — E78.00 HYPERCHOLESTEROLEMIA: Primary | Chronic | ICD-10-CM

## 2024-11-04 LAB
25(OH)D3 SERPL-MCNC: 51 NG/ML (ref 30–100)
ALBUMIN SERPL BCP-MCNC: 4.5 G/DL (ref 3.4–5)
ALP SERPL-CCNC: 110 U/L (ref 33–136)
ALT SERPL W P-5'-P-CCNC: 41 U/L (ref 7–45)
AST SERPL W P-5'-P-CCNC: 32 U/L (ref 9–39)
BILIRUB DIRECT SERPL-MCNC: 0.1 MG/DL (ref 0–0.3)
BILIRUB SERPL-MCNC: 0.5 MG/DL (ref 0–1.2)
CHOLEST SERPL-MCNC: 141 MG/DL (ref 0–199)
CHOLESTEROL/HDL RATIO: 3.4
HDLC SERPL-MCNC: 41.7 MG/DL
LDLC SERPL CALC-MCNC: 59 MG/DL
NON HDL CHOLESTEROL: 99 MG/DL (ref 0–149)
PROT SERPL-MCNC: 7.4 G/DL (ref 6.4–8.2)
TRIGL SERPL-MCNC: 203 MG/DL (ref 0–149)
VLDL: 41 MG/DL (ref 0–40)

## 2024-11-04 PROCEDURE — 4010F ACE/ARB THERAPY RXD/TAKEN: CPT | Performed by: FAMILY MEDICINE

## 2024-11-04 PROCEDURE — 90662 IIV NO PRSV INCREASED AG IM: CPT | Performed by: FAMILY MEDICINE

## 2024-11-04 PROCEDURE — 80061 LIPID PANEL: CPT

## 2024-11-04 PROCEDURE — 1160F RVW MEDS BY RX/DR IN RCRD: CPT | Performed by: FAMILY MEDICINE

## 2024-11-04 PROCEDURE — 3060F POS MICROALBUMINURIA REV: CPT | Performed by: FAMILY MEDICINE

## 2024-11-04 PROCEDURE — 99214 OFFICE O/P EST MOD 30 MIN: CPT | Performed by: FAMILY MEDICINE

## 2024-11-04 PROCEDURE — 3048F LDL-C <100 MG/DL: CPT | Performed by: FAMILY MEDICINE

## 2024-11-04 PROCEDURE — G0008 ADMIN INFLUENZA VIRUS VAC: HCPCS | Performed by: FAMILY MEDICINE

## 2024-11-04 PROCEDURE — 1159F MED LIST DOCD IN RCRD: CPT | Performed by: FAMILY MEDICINE

## 2024-11-04 PROCEDURE — 3008F BODY MASS INDEX DOCD: CPT | Performed by: FAMILY MEDICINE

## 2024-11-04 PROCEDURE — 82306 VITAMIN D 25 HYDROXY: CPT

## 2024-11-04 PROCEDURE — 1036F TOBACCO NON-USER: CPT | Performed by: FAMILY MEDICINE

## 2024-11-04 PROCEDURE — 3051F HG A1C>EQUAL 7.0%<8.0%: CPT | Performed by: FAMILY MEDICINE

## 2024-11-04 PROCEDURE — 3078F DIAST BP <80 MM HG: CPT | Performed by: FAMILY MEDICINE

## 2024-11-04 PROCEDURE — 1123F ACP DISCUSS/DSCN MKR DOCD: CPT | Performed by: FAMILY MEDICINE

## 2024-11-04 PROCEDURE — 80076 HEPATIC FUNCTION PANEL: CPT

## 2024-11-04 PROCEDURE — 36415 COLL VENOUS BLD VENIPUNCTURE: CPT

## 2024-11-04 PROCEDURE — 3074F SYST BP LT 130 MM HG: CPT | Performed by: FAMILY MEDICINE

## 2024-11-04 ASSESSMENT — PATIENT HEALTH QUESTIONNAIRE - PHQ9
2. FEELING DOWN, DEPRESSED OR HOPELESS: NOT AT ALL
SUM OF ALL RESPONSES TO PHQ9 QUESTIONS 1 AND 2: 0
1. LITTLE INTEREST OR PLEASURE IN DOING THINGS: NOT AT ALL

## 2024-11-08 DIAGNOSIS — J30.9 ALLERGIC RHINITIS, UNSPECIFIED SEASONALITY, UNSPECIFIED TRIGGER: ICD-10-CM

## 2024-11-11 RX ORDER — FLUTICASONE PROPIONATE 50 MCG
2 SPRAY, SUSPENSION (ML) NASAL DAILY
Qty: 16 G | Refills: 11 | Status: SHIPPED | OUTPATIENT
Start: 2024-11-11

## 2024-11-18 DIAGNOSIS — I10 PRIMARY HYPERTENSION: ICD-10-CM

## 2024-11-18 RX ORDER — AMLODIPINE BESYLATE 10 MG/1
10 TABLET ORAL DAILY
Qty: 90 TABLET | Refills: 3 | Status: SHIPPED | OUTPATIENT
Start: 2024-11-18

## 2025-01-24 ENCOUNTER — APPOINTMENT (OUTPATIENT)
Dept: RADIOLOGY | Facility: CLINIC | Age: 70
End: 2025-01-24
Payer: MEDICARE

## 2025-01-24 DIAGNOSIS — Z12.31 SCREENING MAMMOGRAM FOR BREAST CANCER: ICD-10-CM

## 2025-01-27 ENCOUNTER — HOSPITAL ENCOUNTER (OUTPATIENT)
Dept: RADIOLOGY | Facility: CLINIC | Age: 70
Discharge: HOME | End: 2025-01-27
Payer: MEDICARE

## 2025-01-27 VITALS — HEIGHT: 64 IN | BODY MASS INDEX: 26.31 KG/M2 | WEIGHT: 154.1 LBS

## 2025-01-27 DIAGNOSIS — Z12.31 SCREENING MAMMOGRAM FOR BREAST CANCER: ICD-10-CM

## 2025-01-27 PROCEDURE — 77067 SCR MAMMO BI INCL CAD: CPT | Performed by: RADIOLOGY

## 2025-01-27 PROCEDURE — 77067 SCR MAMMO BI INCL CAD: CPT

## 2025-01-27 PROCEDURE — 77063 BREAST TOMOSYNTHESIS BI: CPT | Performed by: RADIOLOGY

## 2025-03-17 DIAGNOSIS — E78.00 HYPERCHOLESTEROLEMIA: ICD-10-CM

## 2025-03-18 RX ORDER — ROSUVASTATIN CALCIUM 20 MG/1
20 TABLET, COATED ORAL DAILY
Qty: 90 TABLET | Refills: 3 | Status: SHIPPED | OUTPATIENT
Start: 2025-03-18

## 2025-04-29 NOTE — PROGRESS NOTES
PRIMARY CARE- OFFICE VISIT       Subjective              Codi Shook is a 69 y.o. female here for follow up        Chief Complaint   Patient presents with    Medicare Annual Wellness Visit Initial       HPI     Pt is here for follow up office visit - Medicare Wellness/health maintenance were also discussed    Pt states she has had a rash/infection of the hair follicles for a few months. Starts under the skin, itches at times and then becomes a pimple and then hair falls out    Pt is also here for Follow up for BP, DM, CKD, anx, dep , allergies, insomnia, vit d , chol          Scales reviewed        Patient Health Questionnaire-2 Score: 1 (05/05/25 0924)       Due Cologuard       Other medical specialists are involved in patient's care.    Any recent notes that were available were reviewed.    All conditions are monitored, evaluated and assessed regularly.    Patient is compliant with current treatment regimen/medications.    Specialists involved include:    Dr Darrel Barlow - PN 5/22/24                  Lab Results   Component Value Date    ALBUMINUR 151.1 05/20/2024    HGBA1C 7.3 (H) 04/26/2024       Pt is doing well                 Patient Health Questionnaire-2 Score: 1 (05/05/25 0924)       A physical exam and/or Medicare Wellness was done at  today's office visit and if patient has requested other problems be addressed at today's office visit in addition to the physical portion of the visit:  the patient has been informed that they may be charged for both portions: discussing the medical problems they want addressed and the complete physical exam. Patient has signed consent and was given brochure.          Medicare Wellness Exam    The patent is being seen for subsequent  annual wellness visit  Past Medical, Surgical and family History: Reviewed and updated in chart  Medications and Supplements: Review of all medications by a prescribing practitioner or clinical pharmacist (such as prescriptions,  OTC, Herbal therapies and supplements) documented in the medical record.      Immunization History   Administered Date(s) Administered    COVID-19, mRNA, LNP-S, PF, 30 mcg/0.3 mL dose 03/17/2021, 04/07/2021    Flu vaccine (IIV4), preservative free *Check age/dose* 11/17/2016, 08/07/2017, 10/31/2018, 08/27/2019    Flu vaccine, quadrivalent, high-dose, preservative free, age 65y+ (FLUZONE) 10/19/2021, 09/19/2022, 10/24/2023    Flu vaccine, trivalent, preservative free, HIGH-DOSE, age 65y+ (Fluzone) 10/08/2020, 10/19/2021, 09/19/2022, 11/04/2024    Flu vaccine, trivalent, preservative free, age 6 months and greater (Fluarix/Fluzone/Flulaval) 10/09/2019    Influenza Whole 10/10/2014, 09/24/2015    Influenza, Unspecified 10/28/2010, 09/14/2012, 10/22/2013, 10/10/2014, 09/24/2015, 11/17/2016, 08/07/2017, 10/31/2018, 08/27/2019, 10/19/2021, 09/19/2022    Influenza, seasonal, injectable 10/28/2010, 10/22/2013    Pneumococcal conjugate vaccine, 13-valent (PREVNAR 13) 09/01/2020    Pneumococcal polysaccharide vaccine, 23-valent, age 2 years and older (PNEUMOVAX 23) 10/19/2021    Tdap vaccine, age 7 year and older (BOOSTRIX, ADACEL) 01/01/2005, 11/05/2012, 01/01/2013, 10/22/2013       Labs reported in this progress note have been reviewed at or prior to this office visit.        Lab on 11/04/2024   Component Date Value Ref Range Status    Albumin 11/04/2024 4.5  3.4 - 5.0 g/dL Final    Bilirubin, Total 11/04/2024 0.5  0.0 - 1.2 mg/dL Final    Bilirubin, Direct 11/04/2024 0.1  0.0 - 0.3 mg/dL Final    Alkaline Phosphatase 11/04/2024 110  33 - 136 U/L Final    ALT 11/04/2024 41  7 - 45 U/L Final    Patients treated with Sulfasalazine may generate falsely decreased results for ALT.    AST 11/04/2024 32  9 - 39 U/L Final    Total Protein 11/04/2024 7.4  6.4 - 8.2 g/dL Final    Cholesterol 11/04/2024 141  0 - 199 mg/dL Final          Age      Desirable   Borderline High   High     0-19 Y     0 - 169       170 - 199     >/= 200     20-24 Y     0 - 189       190 - 224     >/= 225         >24 Y     0 - 199       200 - 239     >/= 240   **All ranges are based on fasting samples. Specific   therapeutic targets will vary based on patient-specific   cardiac risk.    Pediatric guidelines reference:Pediatrics 2011, 128(S5).Adult guidelines reference: NCEP ATPIII Guidelines,QUENTIN 2001, 258:2486-97    Venipuncture immediately after or during the administration of Metamizole may lead to falsely low results. Testing should be performed immediately prior to Metamizole dosing.    HDL-Cholesterol 11/04/2024 41.7  mg/dL Final      Age       Very Low   Low     Normal    High    0-19 Y    < 35      < 40     40-45     ----  20-24 Y    ----     < 40      >45      ----        >24 Y      ----     < 40     40-60      >60      Cholesterol/HDL Ratio 11/04/2024 3.4   Final      Ref Values  Desirable  < 3.4  High Risk  > 5.0    LDL Calculated 11/04/2024 59  <=99 mg/dL Final                                Near   Borderline      AGE      Desirable  Optimal    High     High     Very High     0-19 Y     0 - 109     ---    110-129   >/= 130     ----    20-24 Y     0 - 119     ---    120-159   >/= 160     ----      >24 Y     0 -  99   100-129  130-159   160-189     >/=190      VLDL 11/04/2024 41 (H)  0 - 40 mg/dL Final    Triglycerides 11/04/2024 203 (H)  0 - 149 mg/dL Final    Age              Desirable        Borderline         High        Very High  SEX:B           mg/dL             mg/dL               mg/dL      mg/dL  <=14D                       ----               ----        ----  15D-365D                    ----               ----        ----  1Y-9Y           0-74               75-99             >=100       ----  10Y-19Y        0-89                            >=130       ----  20Y-24Y        0-114             115-149             >=150      ----  >= 25Y         0-149             150-199             200-499    >=500      Venipuncture immediately after or  during the administration of Metamizole may lead to falsely low results. Testing should be performed immediately prior to Metamizole dosing.    Non HDL Cholesterol 11/04/2024 99  0 - 149 mg/dL Final          Age       Desirable   Borderline High   High     Very High     0-19 Y     0 - 119       120 - 144     >/= 145    >/= 160    20-24 Y     0 - 149       150 - 189     >/= 190      ----         >24 Y    30 mg/dL above LDL Cholesterol goal      Vitamin D, 25-Hydroxy, Total 11/04/2024 51  30 - 100 ng/mL Final   Lab on 05/20/2024   Component Date Value Ref Range Status    Albumin 05/20/2024 4.3  3.4 - 5.0 g/dL Final    Bilirubin, Total 05/20/2024 0.5  0.0 - 1.2 mg/dL Final    Bilirubin, Direct 05/20/2024 0.1  0.0 - 0.3 mg/dL Final    Alkaline Phosphatase 05/20/2024 110  33 - 136 U/L Final    ALT 05/20/2024 48 (H)  7 - 45 U/L Final    Patients treated with Sulfasalazine may generate falsely decreased results for ALT.    AST 05/20/2024 34  9 - 39 U/L Final    Total Protein 05/20/2024 7.3  6.4 - 8.2 g/dL Final    Glucose 05/20/2024 149 (H)  74 - 99 mg/dL Final    Sodium 05/20/2024 140  136 - 145 mmol/L Final    Potassium 05/20/2024 4.2  3.5 - 5.3 mmol/L Final    Chloride 05/20/2024 102  98 - 107 mmol/L Final    Bicarbonate 05/20/2024 26  21 - 32 mmol/L Final    Anion Gap 05/20/2024 16  10 - 20 mmol/L Final    Urea Nitrogen 05/20/2024 20  6 - 23 mg/dL Final    Creatinine 05/20/2024 1.08 (H)  0.50 - 1.05 mg/dL Final    eGFR 05/20/2024 56 (L)  >60 mL/min/1.73m*2 Final    Calculations of estimated GFR are performed using the 2021 CKD-EPI Study Refit equation without the race variable for the IDMS-Traceable creatinine methods.  https://jasn.asnjournals.org/content/early/2021/09/22/ASN.0867707622    Calcium 05/20/2024 9.7  8.6 - 10.6 mg/dL Final    Albumin, Urine Random 05/20/2024 151.1  Not established mg/L Final    Creatinine, Urine Random 05/20/2024 78.5  20.0 - 320.0 mg/dL Final    Albumin/Creatinine Ratio 05/20/2024 192.5  (H)  <30.0 ug/mg Creat Final    Color, Urine 05/20/2024 Yellow  Light-Yellow, Yellow, Dark-Yellow Final    Appearance, Urine 05/20/2024 Clear  Clear Final    Specific Gravity, Urine 05/20/2024 1.015  1.005 - 1.035 Final    pH, Urine 05/20/2024 5.5  5.0, 5.5, 6.0, 6.5, 7.0, 7.5, 8.0 Final    Protein, Urine 05/20/2024 10 (TRACE)  NEGATIVE, 10 (TRACE), 20 (TRACE) mg/dL Final    Glucose, Urine 05/20/2024 Normal  Normal mg/dL Final    Blood, Urine 05/20/2024 NEGATIVE  NEGATIVE Final    Ketones, Urine 05/20/2024 NEGATIVE  NEGATIVE mg/dL Final    Bilirubin, Urine 05/20/2024 NEGATIVE  NEGATIVE Final    Urobilinogen, Urine 05/20/2024 Normal  Normal mg/dL Final    Nitrite, Urine 05/20/2024 NEGATIVE  NEGATIVE Final    Leukocyte Esterase, Urine 05/20/2024 500 Martha/µL (A)  NEGATIVE Final    WBC, Urine 05/20/2024 6-10 (A)  1-5, NONE /HPF Final    RBC, Urine 05/20/2024 1-2  NONE, 1-2, 3-5 /HPF Final    Squamous Epithelial Cells, Urine 05/20/2024 1-9 (SPARSE)  Reference range not established. /HPF Final    Bacteria, Urine 05/20/2024 1+ (A)  NONE SEEN /HPF Final    Mucus, Urine 05/20/2024 FEW  Reference range not established. /LPF Final             Health Maintenance Topic       Topic Date     Zoster Vaccines Recommend at pharmacy    Medicare Annual Wellness Visit (AWV) today    DTaP/Tdap/Td Vaccines Recommend at pharmacy     Health Maintenance Topics       Topic Date     Colorectal Cancer Screening- cologuard neg  5/1/22     Health Maintenance Topics with due status: Not Due       Topic Last Completion Date    Mammogram 01/27/2025     Health Maintenance Topics with due status: Completed       Topic Last Completion Date    Hepatitis C Screening 05/08/2019    Pneumococcal Vaccine 10/19/2021    Bone Density Scan 05/09/2022               Tobacco Use History[1]      Social History     Substance and Sexual Activity   Alcohol Use Yes    Comment: very rarely          Current Medications[2]      The following portions of the patient's chart  "were reviewed in this encounter and updated as appropriate:  Tobacco  Allergies  Meds  Problems  Med Hx  Surg Hx     Family History[3]                   Lifestyle and medical management to decrease cardiovascular risks.      The following portions of the patient's chart were reviewed in this encounter and updated as appropriate:  Tobacco  Allergies  Meds  Problems  Med Hx  Surg Hx  Fam Hx         Review of Systems       Objective      Objective        Vitals:    05/05/25 0926   BP: 120/69   BP Location: Left arm   Patient Position: Sitting   BP Cuff Size: Adult   Pulse: 67   Resp: 14   Temp: 36.2 °C (97.2 °F)   TempSrc: Temporal   SpO2: 96%   Weight: 69.8 kg (153 lb 14.4 oz)   Height: 1.626 m (5' 4\")         Patient is alert and oriented x 3 , NAD  HEAD- normocephalic and atraumatic   EYES-conjunctiva-normal, lids - normal  EARS/NOSE- normal external exam   NECK-supple,FROM  CV- RRR without murmur  PULM- CTA bilaterally, normal respiratory effort  RESPIRATORY EFFORT- normal , no retractions or nasal flaring   ABD- normoactive BS's   EXT- no edema,NT  SKIN- follicular rash upper back ,forntal scalp , right forearm   NEURO- no focal deficits  PSYCH- pleasant, normal judgement and insight      BP Readings from Last 3 Encounters:   05/05/25 120/69   11/04/24 122/70   05/22/24 122/66           Wt Readings from Last 3 Encounters:   05/05/25 69.8 kg (153 lb 14.4 oz)   01/27/25 69.9 kg (154 lb 1.6 oz)   11/04/24 69.9 kg (154 lb 1.6 oz)         BMI Readings from Last 3 Encounters:   05/05/25 26.42 kg/m²   01/27/25 26.44 kg/m²   11/04/24 26.45 kg/m²         The number and complexity of problems addressed is considered moderate.  The amount and/or complexity of data reviewed and analyzed is considered moderate. The risk of complications and/or morbidity/mortality of patient is considered moderate. Overall, this patient encounter is considered a moderate risk visit.    Patient has no concerns with pain " today.      Patient is not on any high risk medications.      Patient is compliant with their medications.    Statins:   Although side effects believed to be caused by statins can be annoying, consider the benefits of taking a statin before you decide to stop taking your medication. Remember that statin medications can reduce your risk of a heart attack or stroke, and the risk of life-threatening side effects from statins is very low.    If you have read about the potential side effects of statins, you may be more likely to blame your symptoms on the medication, whether or not they're truly caused by the drug.    Even if your side effects are frustrating, don't stop taking your statin medication for any period of time without talking to your doctor first. Your doctor may be able to come up with an alternative treatment plan that can help you lower your cholesterol without uncomfortable side effects.    Side effects that may occur include muscle aches, elevation liver enzymes, very small incidence memory loss or confusion and increasing blood sugar.    Assessment/Plan        Assessment/Plan            Assessment & Plan  Medicare annual wellness visit, subsequent         Screening for multiple conditions         Primary hypertension  Amlodipine  carvediol   Lisinopril        Hypercholesterolemia  Rosuvastatin   Fish oil   Orders:    Follow Up In [facility] - PCP - Established    Mild anxiety  See depression        Insomnia, unspecified type         Vitamin D deficiency  Taking supplements        Type 2 diabetes mellitus with microalbuminuria, without long-term current use of insulin (Multi)         Stage 3a chronic kidney disease (Multi)         Allergic rhinitis, unspecified seasonality, unspecified trigger  Claritin  Flonase        Depression, major, recurrent, in remission (CMS-[facility])  Wellbutrin   Zoloft        Hyperparathyroidism (Multi)  Dr Barlow        Type 2 diabetes, HbA1c goal < 7% (Multi)  Farxiga-  per Dr Barlow  Metformin         Screen for colon cancer         Skin lesion         Rash             Continue medications      Ordered lab      Follow up in 6 months                Recommendations for women annual wellness exam:   Make sure screenings for cervical and breast cancer are up to date if applicable- pap smears age 21-65  mammogram starting at age 35- 40 or sooner if positive family history of breast cancer   colonoscopy at age 45, earlier if positive family history for breast or colon cancer   Screen for osteoporosis with DXA bone scan starting at age 65 or sooner if risk factors present (long term steroid use, smoking, heavy alcohol use, history of fracture, rheumatoid arthritis, low body weight, family history of hip fracture)  Screening for lung cancer with low-dose CT in all adults age 50-80 who have a 20 pack-year smoking history and currently smoke or have quit within the past 15 years; and are symptom free/no prior pulmonary diagnosis  Gardisil vaccine age 9-26 years of age   Follow a healthy diet (Examples, Dash diet, Mediterranean diet)  Exercise 150 minutes per week   Maintain healthy weight (BMI < 25)  Do not smoke   Alcohol in moderation (up to 1 drink/day)  Get enough sleep (7-8 hours/night)  Take a prenatal vitamin with folic acid if possibility of pregnancy   Make sure immunizations are up to date   Recommend minimum 1,000 mg calcium and 600-800 IU vitamin D daily (combination of diet + supplement)- unless there is a contraindication   Visit dentist twice yearly      If you are less than 60 years old, have diabetes mellitus, or chronic kidney disease, your goal blood pressure is < 140/90.  If you are older than 60 years old and do not have diabetes or kidney disease, your goal blood pressure is < 150/90.                Time spent during the office visit includes-    updating and familiarizing myself with patients past medical history, social history, and allergies :  discussing ROS  ;  obtaining direct  chief complaint and history of present illness from patient ,  reviewing and reconciling current medication list - both prescription and over the counter medications    clinically examine patient    determine what testing if any is needed to  diagnose the condition/conditions  with which patient is presenting    using medical knowledge to put all of the information together and decide on best course of action to proceed with diagnosis  and  treatment for the presenting problem or problems    Pre-visit planning, chart review, and face-to-face encounter   Discussion of medications  Coordination with office staff for med adjustments   Final documentation of visit          My total time spent caring for the patient for the day of the encounter (before,during, and after) was =          total minutes.    (Prep+during visit+post visit)          [1]   Social History  Tobacco Use   Smoking Status Never   Smokeless Tobacco Never   [2]   Current Outpatient Medications   Medication Sig Dispense Refill    amLODIPine (Norvasc) 10 mg tablet TAKE 1 TABLET BY MOUTH ONCE A DAY 90 tablet 3    aspirin 81 mg EC tablet Take 1 tablet (81 mg) by mouth once daily.      carvedilol (Coreg) 6.25 mg tablet Take 1 tablet (6.25 mg) by mouth 2 times a day. 180 tablet 3    cholecalciferol (Vitamin D-3) 25 MCG (1000 UT) tablet Take by mouth.      Farxiga 10 mg Take 1 tablet (10 mg) by mouth once daily. 30 tablet 11    fish oil concentrate (Omega-3) 120-180 mg capsule Take by mouth.      fluticasone (Flonase) 50 mcg/actuation nasal spray Administer 2 sprays into each nostril once daily. 16 g 11    lisinopril 40 mg tablet TAKE 1 TABLET (40 MG) BY MOUTH ONCE A DAY. 90 tablet 3    loratadine (Claritin) 10 mg tablet Take by mouth.      metFORMIN  mg 24 hr tablet Take 3 tablets (1,500 mg) by mouth. Take 2 in the morning and 1 in the evening per Dr. Johnson 5/1/2024      rosuvastatin (Crestor) 20 mg tablet TAKE 1 TABLET BY MOUTH  ONCE A DAY 90 tablet 3    sertraline (Zoloft) 100 mg tablet Take 1 tablet (100 mg) by mouth once daily. 90 tablet 3    Wellbutrin  mg 24 hr tablet TAKE 1 TABLET BY MOUTH ONCE A DAY 90 tablet 3     No current facility-administered medications for this visit.   [3]   Family History  Problem Relation Name Age of Onset    Other (enlarged heart) Mother      Heart murmur Sister      Stroke Sister

## 2025-05-05 ENCOUNTER — APPOINTMENT (OUTPATIENT)
Dept: PRIMARY CARE | Facility: CLINIC | Age: 70
End: 2025-05-05
Payer: MEDICARE

## 2025-05-05 VITALS
HEIGHT: 64 IN | OXYGEN SATURATION: 96 % | HEART RATE: 67 BPM | SYSTOLIC BLOOD PRESSURE: 120 MMHG | WEIGHT: 153.9 LBS | DIASTOLIC BLOOD PRESSURE: 69 MMHG | RESPIRATION RATE: 14 BRPM | TEMPERATURE: 97.2 F | BODY MASS INDEX: 26.27 KG/M2

## 2025-05-05 DIAGNOSIS — F41.9 MILD ANXIETY: Chronic | ICD-10-CM

## 2025-05-05 DIAGNOSIS — G47.00 INSOMNIA, UNSPECIFIED TYPE: Chronic | ICD-10-CM

## 2025-05-05 DIAGNOSIS — Z12.11 SCREEN FOR COLON CANCER: Chronic | ICD-10-CM

## 2025-05-05 DIAGNOSIS — R21 RASH: ICD-10-CM

## 2025-05-05 DIAGNOSIS — Z00.00 MEDICARE ANNUAL WELLNESS VISIT, SUBSEQUENT: Primary | Chronic | ICD-10-CM

## 2025-05-05 DIAGNOSIS — E11.9 TYPE 2 DIABETES, HBA1C GOAL < 7% (MULTI): Chronic | ICD-10-CM

## 2025-05-05 DIAGNOSIS — I10 PRIMARY HYPERTENSION: Chronic | ICD-10-CM

## 2025-05-05 DIAGNOSIS — Z13.89 SCREENING FOR MULTIPLE CONDITIONS: Chronic | ICD-10-CM

## 2025-05-05 DIAGNOSIS — N18.31 STAGE 3A CHRONIC KIDNEY DISEASE (MULTI): Chronic | ICD-10-CM

## 2025-05-05 DIAGNOSIS — E78.00 HYPERCHOLESTEROLEMIA: Chronic | ICD-10-CM

## 2025-05-05 DIAGNOSIS — E55.9 VITAMIN D DEFICIENCY: Chronic | ICD-10-CM

## 2025-05-05 DIAGNOSIS — E11.29 TYPE 2 DIABETES MELLITUS WITH MICROALBUMINURIA, WITHOUT LONG-TERM CURRENT USE OF INSULIN (MULTI): Chronic | ICD-10-CM

## 2025-05-05 DIAGNOSIS — E21.3 HYPERPARATHYROIDISM (MULTI): Chronic | ICD-10-CM

## 2025-05-05 DIAGNOSIS — R80.9 TYPE 2 DIABETES MELLITUS WITH MICROALBUMINURIA, WITHOUT LONG-TERM CURRENT USE OF INSULIN (MULTI): Chronic | ICD-10-CM

## 2025-05-05 DIAGNOSIS — L98.9 SKIN LESION: Chronic | ICD-10-CM

## 2025-05-05 DIAGNOSIS — F33.40 DEPRESSION, MAJOR, RECURRENT, IN REMISSION (CMS-HCC): Chronic | ICD-10-CM

## 2025-05-05 DIAGNOSIS — J30.9 ALLERGIC RHINITIS, UNSPECIFIED SEASONALITY, UNSPECIFIED TRIGGER: Chronic | ICD-10-CM

## 2025-05-05 PROCEDURE — G0439 PPPS, SUBSEQ VISIT: HCPCS | Performed by: FAMILY MEDICINE

## 2025-05-05 PROCEDURE — 3074F SYST BP LT 130 MM HG: CPT | Performed by: FAMILY MEDICINE

## 2025-05-05 PROCEDURE — 1160F RVW MEDS BY RX/DR IN RCRD: CPT | Performed by: FAMILY MEDICINE

## 2025-05-05 PROCEDURE — 3078F DIAST BP <80 MM HG: CPT | Performed by: FAMILY MEDICINE

## 2025-05-05 PROCEDURE — G2211 COMPLEX E/M VISIT ADD ON: HCPCS | Performed by: FAMILY MEDICINE

## 2025-05-05 PROCEDURE — 1123F ACP DISCUSS/DSCN MKR DOCD: CPT | Performed by: FAMILY MEDICINE

## 2025-05-05 PROCEDURE — 1170F FXNL STATUS ASSESSED: CPT | Performed by: FAMILY MEDICINE

## 2025-05-05 PROCEDURE — 1036F TOBACCO NON-USER: CPT | Performed by: FAMILY MEDICINE

## 2025-05-05 PROCEDURE — 99214 OFFICE O/P EST MOD 30 MIN: CPT | Performed by: FAMILY MEDICINE

## 2025-05-05 PROCEDURE — 4010F ACE/ARB THERAPY RXD/TAKEN: CPT | Performed by: FAMILY MEDICINE

## 2025-05-05 PROCEDURE — 1159F MED LIST DOCD IN RCRD: CPT | Performed by: FAMILY MEDICINE

## 2025-05-05 PROCEDURE — 3008F BODY MASS INDEX DOCD: CPT | Performed by: FAMILY MEDICINE

## 2025-05-05 RX ORDER — AMOXICILLIN AND CLAVULANATE POTASSIUM 875; 125 MG/1; MG/1
TABLET, FILM COATED ORAL
Qty: 20 TABLET | Refills: 0 | Status: SHIPPED | OUTPATIENT
Start: 2025-05-05

## 2025-05-05 ASSESSMENT — PATIENT HEALTH QUESTIONNAIRE - PHQ9
10. IF YOU CHECKED OFF ANY PROBLEMS, HOW DIFFICULT HAVE THESE PROBLEMS MADE IT FOR YOU TO DO YOUR WORK, TAKE CARE OF THINGS AT HOME, OR GET ALONG WITH OTHER PEOPLE: NOT DIFFICULT AT ALL
2. FEELING DOWN, DEPRESSED OR HOPELESS: NOT AT ALL
SUM OF ALL RESPONSES TO PHQ9 QUESTIONS 1 AND 2: 1
1. LITTLE INTEREST OR PLEASURE IN DOING THINGS: SEVERAL DAYS

## 2025-05-05 ASSESSMENT — ACTIVITIES OF DAILY LIVING (ADL)
DOING_HOUSEWORK: INDEPENDENT
DRESSING: INDEPENDENT
GROCERY_SHOPPING: INDEPENDENT
BATHING: INDEPENDENT
TAKING_MEDICATION: INDEPENDENT
MANAGING_FINANCES: INDEPENDENT

## 2025-05-05 ASSESSMENT — ENCOUNTER SYMPTOMS
DEPRESSION: 0
LOSS OF SENSATION IN FEET: 0
OCCASIONAL FEELINGS OF UNSTEADINESS: 0

## 2025-05-06 LAB
25(OH)D3+25(OH)D2 SERPL-MCNC: 54 NG/ML (ref 30–100)
ALBUMIN SERPL-MCNC: 4.4 G/DL (ref 3.6–5.1)
ALBUMIN/CREAT UR: 52 MG/G CREAT
ALBUMIN/GLOB SERPL: 1.6 (CALC) (ref 1–2.5)
ALP SERPL-CCNC: 101 U/L (ref 37–153)
ALT SERPL-CCNC: 49 U/L (ref 6–29)
ANION GAP SERPL CALCULATED.4IONS-SCNC: 14 MMOL/L (CALC) (ref 7–17)
APPEARANCE UR: CLEAR
AST SERPL-CCNC: 35 U/L (ref 10–35)
BACTERIA #/AREA URNS HPF: ABNORMAL /HPF
BILIRUB DIRECT SERPL-MCNC: 0.1 MG/DL
BILIRUB INDIRECT SERPL-MCNC: 0.5 MG/DL (CALC) (ref 0.2–1.2)
BILIRUB SERPL-MCNC: 0.6 MG/DL (ref 0.2–1.2)
BILIRUB UR QL STRIP: NEGATIVE
BUN SERPL-MCNC: 20 MG/DL (ref 7–25)
BUN/CREAT SERPL: 16 (CALC) (ref 6–22)
CALCIUM SERPL-MCNC: 9.8 MG/DL (ref 8.6–10.4)
CHLORIDE SERPL-SCNC: 103 MMOL/L (ref 98–110)
CHOLEST SERPL-MCNC: 159 MG/DL
CHOLEST/HDLC SERPL: 3.8 (CALC)
CO2 SERPL-SCNC: 23 MMOL/L (ref 20–32)
COLOR UR: YELLOW
CREAT SERPL-MCNC: 1.26 MG/DL (ref 0.5–1.05)
CREAT UR-MCNC: 122 MG/DL (ref 20–275)
EGFRCR SERPLBLD CKD-EPI 2021: 46 ML/MIN/1.73M2
EST. AVERAGE GLUCOSE BLD GHB EST-MCNC: 171 MG/DL
EST. AVERAGE GLUCOSE BLD GHB EST-SCNC: 9.5 MMOL/L
GLOBULIN SER CALC-MCNC: 2.8 G/DL (CALC) (ref 1.9–3.7)
GLUCOSE SERPL-MCNC: 147 MG/DL (ref 65–99)
GLUCOSE UR QL STRIP: ABNORMAL
HBA1C MFR BLD: 7.6 %
HDLC SERPL-MCNC: 42 MG/DL
HGB UR QL STRIP: NEGATIVE
HYALINE CASTS #/AREA URNS LPF: ABNORMAL /LPF
KETONES UR QL STRIP: NEGATIVE
LDLC SERPL CALC-MCNC: 86 MG/DL (CALC)
LEUKOCYTE ESTERASE UR QL STRIP: ABNORMAL
MICROALBUMIN UR-MCNC: 6.4 MG/DL
NITRITE UR QL STRIP: POSITIVE
NONHDLC SERPL-MCNC: 117 MG/DL (CALC)
PH UR STRIP: 5.5 [PH] (ref 5–8)
POTASSIUM SERPL-SCNC: 4.5 MMOL/L (ref 3.5–5.3)
PROT SERPL-MCNC: 7.2 G/DL (ref 6.1–8.1)
PROT UR QL STRIP: ABNORMAL
RBC #/AREA URNS HPF: ABNORMAL /HPF
SERVICE CMNT-IMP: ABNORMAL
SODIUM SERPL-SCNC: 140 MMOL/L (ref 135–146)
SP GR UR STRIP: 1.02 (ref 1–1.03)
SQUAMOUS #/AREA URNS HPF: ABNORMAL /HPF
TRIGL SERPL-MCNC: 215 MG/DL
WBC #/AREA URNS HPF: ABNORMAL /HPF

## 2025-05-14 ENCOUNTER — APPOINTMENT (OUTPATIENT)
Dept: DERMATOLOGY | Facility: CLINIC | Age: 70
End: 2025-05-14
Payer: MEDICARE

## 2025-05-14 DIAGNOSIS — L73.9 FOLLICULITIS: ICD-10-CM

## 2025-05-14 DIAGNOSIS — L66.9 CICATRICIAL ALOPECIA: Primary | ICD-10-CM

## 2025-05-14 DIAGNOSIS — L98.9 DERMATOLOGIC PROBLEM: ICD-10-CM

## 2025-05-14 PROCEDURE — 99204 OFFICE O/P NEW MOD 45 MIN: CPT | Performed by: DERMATOLOGY

## 2025-05-14 PROCEDURE — 11901 INJECT SKIN LESIONS >7: CPT | Performed by: DERMATOLOGY

## 2025-05-14 PROCEDURE — 1036F TOBACCO NON-USER: CPT | Performed by: DERMATOLOGY

## 2025-05-14 PROCEDURE — 4010F ACE/ARB THERAPY RXD/TAKEN: CPT | Performed by: DERMATOLOGY

## 2025-05-14 PROCEDURE — 1159F MED LIST DOCD IN RCRD: CPT | Performed by: DERMATOLOGY

## 2025-05-14 PROCEDURE — 1160F RVW MEDS BY RX/DR IN RCRD: CPT | Performed by: DERMATOLOGY

## 2025-05-14 RX ORDER — DOXYCYCLINE 100 MG/1
CAPSULE ORAL
Qty: 60 CAPSULE | Refills: 5 | Status: SHIPPED | OUTPATIENT
Start: 2025-05-14

## 2025-05-14 NOTE — Clinical Note
-Discussed the nature of the condition and expectations for treatment  -Discussed treatment options  -Recommend to start serial ILK until controlled  -Recommend to start doxcycyline    -Discussed/information given on the potential adverse effects of rx Doxycycline, including but not limited to, GI upset (nausea, vomiting, diarrhea), photosensitivity (and the need to wear SPF and avoid prolonged outdoor exposure), esophagitis (the patient is to take the medication with food & water and to remain upright for 1 hour after taking medication), and headaches.   -Doxycycline may may be taken with food to avoid GI upset; if taking with milk, multivitamins or antacids, the absorption of Doxycycline may be decreased but this is usually not an issue when treating common dermatologic conditions.        -After history, physical examination and discussion, a decision was made to proceed with intralesional kenalog therapy today  -Risks, benefits and alternatives of intralesional steroids was discussed with patient including the risk of atrophy, striae, telangiectasia, and pigmentary changes. Patient expresses understanding of these risks and verbal consent was obtained from the patient to treat with intralesional Kenalog.  -Intralesional kenalog  10mg/ml x 2ml was injected to affected area(s) after prepping the skin with alcohol. Patient tolerated the procedure well with no complications. A total of 20 lesion(s) were treated. Lot [id number] 6/2027

## 2025-05-14 NOTE — Clinical Note
-Discussed nature of condition  -Discussed treatment options  -This condition is often worsened by heat exposure, sweat exposure, friction/pressure on the skin. Off-load pressure as possible and wear loose, breathable clothing.  -Starting doxycycline for FFA (see above) which will also improve patient's folliculitis

## 2025-05-14 NOTE — PROGRESS NOTES
"Subjective     Dara Shook \"Codi\" is a 69 y.o. female who presents for the following: Folliculitis  (Pt states around forehead and back. Pt states PCP informed it may be folliculitis. Pt states treatment with Augmentin with good response. Pt states sites are red and painful, occur as a \"pimple\" under skin, then site pops, and a hair follicle is present. Patient denies personal history of NMSC. ).     Review of Systems:  No other skin or systemic complaints other than what is documented elsewhere in the note.    The following portions of the chart were reviewed this encounter and updated as appropriate:   Tobacco  Allergies  Meds  Problems  Med Hx  Surg Hx  Fam Hx              Objective   Well appearing patient in no apparent distress; mood and affect are within normal limits.    A focused skin examination was performed. All findings within normal limits unless otherwise noted below.    Assessment/Plan   Skin Exam  1. CICATRICIAL ALOPECIA  Mid Frontal Scalp  Areas of scarring and perifollicular inflammation in frontal distribution; eyebrow loss  -Discussed the nature of the condition and expectations for treatment  -Discussed treatment options  -Recommend to start serial ILK until controlled  -Recommend to start doxcycyline    -Discussed/information given on the potential adverse effects of rx Doxycycline, including but not limited to, GI upset (nausea, vomiting, diarrhea), photosensitivity (and the need to wear SPF and avoid prolonged outdoor exposure), esophagitis (the patient is to take the medication with food & water and to remain upright for 1 hour after taking medication), and headaches.   -Doxycycline may may be taken with food to avoid GI upset; if taking with milk, multivitamins or antacids, the absorption of Doxycycline may be decreased but this is usually not an issue when treating common dermatologic conditions.        -After history, physical examination and discussion, a decision was made " to proceed with intralesional kenalog therapy today  -Risks, benefits and alternatives of intralesional steroids was discussed with patient including the risk of atrophy, striae, telangiectasia, and pigmentary changes. Patient expresses understanding of these risks and verbal consent was obtained from the patient to treat with intralesional Kenalog.  -Intralesional kenalog  10mg/ml x 2ml was injected to affected area(s) after prepping the skin with alcohol. Patient tolerated the procedure well with no complications. A total of 20 lesion(s) were treated. Lot [id number] 6/2027    Related Medications  triamcinolone acetonide (Kenalog) injection 20 mg    doxycycline (Vibramycin) 100 mg capsule  Take one pill by mouth twice daily with food and at least 8 ounces (large glass) of water, do not lie down for 30 minutes after taking.  2. FOLLICULITIS  Left Upper Back, Right Upper Back  Follicular based papules/pustules, some excoriated/cursted  -Discussed nature of condition  -Discussed treatment options  -This condition is often worsened by heat exposure, sweat exposure, friction/pressure on the skin. Off-load pressure as possible and wear loose, breathable clothing.  -Starting doxycycline for FFA (see above) which will also improve patient's folliculitis  Related Medications  doxycycline (Vibramycin) 100 mg capsule  Take one pill by mouth twice daily with food and at least 8 ounces (large glass) of water, do not lie down for 30 minutes after taking.  3. DERMATOLOGIC PROBLEM  Generalized  Related Procedures  Follow Up In Dermatology - Established Patient    Follow up in 1 month for FFA ILK  Discussed if there are any changes or development of concerning symptoms (lesion/skin condition is changing, bleeding, enlarging, or worsening) the patient is to contact my office. The patient verbalizes understanding.    Michell Clarke MD  5/14/2025

## 2025-05-22 ENCOUNTER — APPOINTMENT (OUTPATIENT)
Dept: NEPHROLOGY | Facility: CLINIC | Age: 70
End: 2025-05-22
Payer: MEDICARE

## 2025-05-22 VITALS
HEIGHT: 64 IN | SYSTOLIC BLOOD PRESSURE: 140 MMHG | WEIGHT: 155.5 LBS | HEART RATE: 72 BPM | BODY MASS INDEX: 26.55 KG/M2 | DIASTOLIC BLOOD PRESSURE: 70 MMHG

## 2025-05-22 DIAGNOSIS — N18.31 STAGE 3A CHRONIC KIDNEY DISEASE (MULTI): Primary | Chronic | ICD-10-CM

## 2025-05-22 DIAGNOSIS — R80.9 TYPE 2 DIABETES MELLITUS WITH MICROALBUMINURIA (MULTI): Chronic | ICD-10-CM

## 2025-05-22 DIAGNOSIS — E11.29 TYPE 2 DIABETES MELLITUS WITH MICROALBUMINURIA (MULTI): Chronic | ICD-10-CM

## 2025-05-22 DIAGNOSIS — R80.9 MICROALBUMINURIA: ICD-10-CM

## 2025-05-22 DIAGNOSIS — R80.9 TYPE 2 DIABETES MELLITUS WITH MICROALBUMINURIA, WITHOUT LONG-TERM CURRENT USE OF INSULIN (MULTI): Chronic | ICD-10-CM

## 2025-05-22 DIAGNOSIS — E55.9 VITAMIN D DEFICIENCY: Chronic | ICD-10-CM

## 2025-05-22 DIAGNOSIS — I10 PRIMARY HYPERTENSION: Chronic | ICD-10-CM

## 2025-05-22 DIAGNOSIS — E11.29 TYPE 2 DIABETES MELLITUS WITH MICROALBUMINURIA, WITHOUT LONG-TERM CURRENT USE OF INSULIN (MULTI): Chronic | ICD-10-CM

## 2025-05-22 PROCEDURE — 3078F DIAST BP <80 MM HG: CPT | Performed by: CLINICAL NURSE SPECIALIST

## 2025-05-22 PROCEDURE — 1159F MED LIST DOCD IN RCRD: CPT | Performed by: CLINICAL NURSE SPECIALIST

## 2025-05-22 PROCEDURE — 3008F BODY MASS INDEX DOCD: CPT | Performed by: CLINICAL NURSE SPECIALIST

## 2025-05-22 PROCEDURE — 3077F SYST BP >= 140 MM HG: CPT | Performed by: CLINICAL NURSE SPECIALIST

## 2025-05-22 PROCEDURE — 4010F ACE/ARB THERAPY RXD/TAKEN: CPT | Performed by: CLINICAL NURSE SPECIALIST

## 2025-05-22 PROCEDURE — 1036F TOBACCO NON-USER: CPT | Performed by: CLINICAL NURSE SPECIALIST

## 2025-05-22 PROCEDURE — 1160F RVW MEDS BY RX/DR IN RCRD: CPT | Performed by: CLINICAL NURSE SPECIALIST

## 2025-05-22 PROCEDURE — 99213 OFFICE O/P EST LOW 20 MIN: CPT | Performed by: CLINICAL NURSE SPECIALIST

## 2025-05-22 RX ORDER — DAPAGLIFLOZIN 10 MG/1
10 TABLET, FILM COATED ORAL DAILY
Qty: 30 TABLET | Refills: 11 | Status: SHIPPED | OUTPATIENT
Start: 2025-05-22 | End: 2025-05-22 | Stop reason: SDUPTHER

## 2025-05-22 RX ORDER — DAPAGLIFLOZIN 10 MG/1
10 TABLET, FILM COATED ORAL DAILY
Qty: 90 TABLET | Refills: 3 | Status: SHIPPED | OUTPATIENT
Start: 2025-05-22 | End: 2026-05-22

## 2025-05-22 ASSESSMENT — ENCOUNTER SYMPTOMS
ENDOCRINE NEGATIVE: 1
CARDIOVASCULAR NEGATIVE: 1
EYES NEGATIVE: 1
NEUROLOGICAL NEGATIVE: 1
GASTROINTESTINAL NEGATIVE: 1
HEMATOLOGIC/LYMPHATIC NEGATIVE: 1
ALLERGIC/IMMUNOLOGIC NEGATIVE: 1
RESPIRATORY NEGATIVE: 1
PSYCHIATRIC NEGATIVE: 1
CONSTITUTIONAL NEGATIVE: 1
MUSCULOSKELETAL NEGATIVE: 1

## 2025-05-22 NOTE — PROGRESS NOTES
"Subjective   Patient ID: Dara Shook \"Le" is a 69 y.o. female who presents for Follow-up (1 year/).  Patient being seen in follow-up for chronic kidney disease stage III with history of diabetes and hypertension    Labs reviewed  Hemoglobin A1c 7.6  Vitamin D 54  Total cholesterol 159, HDL 42, LDL 86, triglycerides 215  LFTs essentially within normal limits with ALT of 49  Urinalysis positive for nitrites, 2+ leukoesterase, 20-40 WBCs and trace proteins  Albumin creatinine ratio 52  Glucose 147  Renal function the BUN of 20 and creatinine of 1.26, GFR is 46  Sodium 140, potassium 4.5, chloride 103, bicarb 23  Calcium 9.8    She has been feeling well  She has been able to take her Farxiga  Blood pressure is well-controlled, she does check it at home and is normally in the 120s systolically  She has no edema  She is voiding without difficulties        Review of Systems   Constitutional: Negative.    HENT: Negative.     Eyes: Negative.    Respiratory: Negative.     Cardiovascular: Negative.    Gastrointestinal: Negative.    Endocrine: Negative.    Genitourinary: Negative.    Musculoskeletal: Negative.    Skin: Negative.    Allergic/Immunologic: Negative.    Neurological: Negative.    Hematological: Negative.    Psychiatric/Behavioral: Negative.         Objective   Physical Exam  Constitutional:       Appearance: Normal appearance.   HENT:      Head: Normocephalic and atraumatic.      Mouth/Throat:      Mouth: Mucous membranes are moist.   Eyes:      Extraocular Movements: Extraocular movements intact.   Cardiovascular:      Rate and Rhythm: Normal rate and regular rhythm.      Heart sounds: Normal heart sounds.   Pulmonary:      Effort: Pulmonary effort is normal.      Breath sounds: Normal breath sounds.   Abdominal:      General: Bowel sounds are normal.      Palpations: Abdomen is soft.   Musculoskeletal:         General: Normal range of motion.   Skin:     General: Skin is warm and dry.   Neurological:     "  Mental Status: She is alert.   Psychiatric:         Behavior: Behavior normal.         Thought Content: Thought content normal.       Assessment/Plan   Problem List Items Addressed This Visit           ICD-10-CM    Primary hypertension (Chronic) I10    Pressure is well-controlled on carvedilol and lisinopril         Microalbuminuria R80.9    Albumin creatinine ratio has decreased slightly and is currently at 52, last time was greater than 100         Stage 3a chronic kidney disease (Multi) - Primary (Chronic) N18.31    She has had a slight increase in her creatinine to 1.26, in the past she was around 1.1, GFR is 46, electrolytes are stable, blood pressure is well-controlled, last hemoglobin A1c was 7.6, we did talk about the slight increase in her hemoglobin A1c from 7.3-7.6, currently on metformin and Farxiga, she is following up with her primary care physician with this, does not use NSAIDs, will plan on follow-up in 6 months with her lab work I will call her with these results to make sure that her creatinine is not trending up         Relevant Medications    Farxiga 10 mg tablet    Other Relevant Orders    Basic Metabolic Panel    Albumin-Creatinine Ratio, Urine Random    Urinalysis with Reflex Microscopic    Basic metabolic panel    Follow Up In Nephrology    Basic metabolic panel    Albumin-Creatinine Ratio, Urine Random    Urinalysis with Reflex Microscopic    Vitamin D deficiency (Chronic) E55.9    Type 2 diabetes mellitus with microalbuminuria, without long-term current use of insulin (Multi) (Chronic) E11.29, R80.9     Other Visit Diagnoses         Codes      Type 2 diabetes mellitus with microalbuminuria (Multi)  (Chronic)    E11.29, R80.9    Relevant Medications    Farxiga 10 mg tablet          Chronic kidney disease stage III with baseline creatinine 1-1.2  Diabetes mellitus type 2  Hypertension  History of hypercalcemia  Proteinuria of 0.74  Recurrent UTIs  Bilateral renal simple cyst          Ileana PEPPER  LEANN Barlow-EDGAR, DNP 05/22/25 10:01 AM

## 2025-05-22 NOTE — ASSESSMENT & PLAN NOTE
Albumin creatinine ratio has decreased slightly and is currently at 52, last time was greater than 100

## 2025-05-22 NOTE — ASSESSMENT & PLAN NOTE
She has had a slight increase in her creatinine to 1.26, in the past she was around 1.1, GFR is 46, electrolytes are stable, blood pressure is well-controlled, last hemoglobin A1c was 7.6, we did talk about the slight increase in her hemoglobin A1c from 7.3-7.6, currently on metformin and Farxiga, she is following up with her primary care physician with this, does not use NSAIDs, will plan on follow-up in 6 months with her lab work I will call her with these results to make sure that her creatinine is not trending up

## 2025-06-15 LAB — NONINV COLON CA DNA+OCC BLD SCRN STL QL: NEGATIVE

## 2025-06-25 ENCOUNTER — APPOINTMENT (OUTPATIENT)
Dept: DERMATOLOGY | Facility: CLINIC | Age: 70
End: 2025-06-25
Payer: MEDICARE

## 2025-06-25 DIAGNOSIS — L73.9 FOLLICULITIS: ICD-10-CM

## 2025-06-25 DIAGNOSIS — L66.9 CICATRICIAL ALOPECIA: Primary | ICD-10-CM

## 2025-06-25 DIAGNOSIS — L98.9 DERMATOLOGIC PROBLEM: ICD-10-CM

## 2025-06-25 PROCEDURE — 4010F ACE/ARB THERAPY RXD/TAKEN: CPT | Performed by: DERMATOLOGY

## 2025-06-25 PROCEDURE — 11901 INJECT SKIN LESIONS >7: CPT | Performed by: DERMATOLOGY

## 2025-06-25 PROCEDURE — 1159F MED LIST DOCD IN RCRD: CPT | Performed by: DERMATOLOGY

## 2025-06-25 PROCEDURE — 1160F RVW MEDS BY RX/DR IN RCRD: CPT | Performed by: DERMATOLOGY

## 2025-06-25 PROCEDURE — 1036F TOBACCO NON-USER: CPT | Performed by: DERMATOLOGY

## 2025-06-25 PROCEDURE — 99214 OFFICE O/P EST MOD 30 MIN: CPT | Performed by: DERMATOLOGY

## 2025-06-25 NOTE — Clinical Note
Improving on therapy; remains inadequately controlled  -Continue serial ILK until controlled; round 2 today  -Continue doxcycline; tolerating well    -Discussed/information given on the potential adverse effects of rx Doxycycline, including but not limited to, GI upset (nausea, vomiting, diarrhea), photosensitivity (and the need to wear SPF and avoid prolonged outdoor exposure), esophagitis (the patient is to take the medication with food & water and to remain upright for 1 hour after taking medication), and headaches.   -Doxycycline may may be taken with food to avoid GI upset; if taking with milk, multivitamins or antacids, the absorption of Doxycycline may be decreased but this is usually not an issue when treating common dermatologic conditions.        -After history, physical examination and discussion, a decision was made to proceed with intralesional kenalog therapy today  -Risks, benefits and alternatives of intralesional steroids was discussed with patient including the risk of atrophy, striae, telangiectasia, and pigmentary changes. Patient expresses understanding of these risks and verbal consent was obtained from the patient to treat with intralesional Kenalog.  -Intralesional kenalog  10mg/ml x 1ml was injected to affected area(s) after prepping the skin with alcohol. Patient tolerated the procedure well with no complications. A total of 10 lesion(s) were treated. Lot [id number] 6/2027

## 2025-06-25 NOTE — PROGRESS NOTES
"Subjective     Dara Shook \"Codi\" is a 69 y.o. female who presents for the following: Cicatricial alopecia (Scalp. Follow up. Pt states treatment with doxycyline with good response. Pt states previously injected with ILK. ).     Review of Systems:  No other skin or systemic complaints other than what is documented elsewhere in the note.    The following portions of the chart were reviewed this encounter and updated as appropriate:   Tobacco  Allergies  Meds  Problems  Med Hx  Surg Hx  Fam Hx              Objective   Well appearing patient in no apparent distress; mood and affect are within normal limits.    A focused skin examination was performed. All findings within normal limits unless otherwise noted below.    Assessment/Plan   Skin Exam  1. CICATRICIAL ALOPECIA  Mid Frontal Scalp  Areas of scarring and inflammation in frontal distribution; eyebrow loss  Improving on therapy; remains inadequately controlled  -Continue serial ILK until controlled; round 2 today  -Continue doxcycline; tolerating well    -Discussed/information given on the potential adverse effects of rx Doxycycline, including but not limited to, GI upset (nausea, vomiting, diarrhea), photosensitivity (and the need to wear SPF and avoid prolonged outdoor exposure), esophagitis (the patient is to take the medication with food & water and to remain upright for 1 hour after taking medication), and headaches.   -Doxycycline may may be taken with food to avoid GI upset; if taking with milk, multivitamins or antacids, the absorption of Doxycycline may be decreased but this is usually not an issue when treating common dermatologic conditions.        -After history, physical examination and discussion, a decision was made to proceed with intralesional kenalog therapy today  -Risks, benefits and alternatives of intralesional steroids was discussed with patient including the risk of atrophy, striae, telangiectasia, and pigmentary changes. " Patient expresses understanding of these risks and verbal consent was obtained from the patient to treat with intralesional Kenalog.  -Intralesional kenalog  10mg/ml x 1ml was injected to affected area(s) after prepping the skin with alcohol. Patient tolerated the procedure well with no complications. A total of 10 lesion(s) were treated. Lot [id number] 6/2027    Related Medications  doxycycline (Vibramycin) 100 mg capsule  Take one pill by mouth twice daily with food and at least 8 ounces (large glass) of water, do not lie down for 30 minutes after taking.  triamcinolone acetonide (Kenalog) injection 10 mg    2. FOLLICULITIS  Left Upper Back, Right Upper Back  PIE  Improved on doxycycline for FFA (see above)  Related Medications  doxycycline (Vibramycin) 100 mg capsule  Take one pill by mouth twice daily with food and at least 8 ounces (large glass) of water, do not lie down for 30 minutes after taking.  3. DERMATOLOGIC PROBLEM  Generalized  Related Procedures  Follow Up In Dermatology - Established Patient  Follow Up In Dermatology - Established Patient    Follow up in 3 months for FFA  Discussed if there are any changes or development of concerning symptoms (lesion/skin condition is changing, bleeding, enlarging, or worsening) the patient is to contact my office. The patient verbalizes understanding.    Michell Clarke MD  6/25/2025

## 2025-08-11 DIAGNOSIS — I10 PRIMARY HYPERTENSION: ICD-10-CM

## 2025-08-12 RX ORDER — CARVEDILOL 6.25 MG/1
6.25 TABLET ORAL
Qty: 180 TABLET | Refills: 3 | Status: SHIPPED | OUTPATIENT
Start: 2025-08-12

## 2025-08-25 DIAGNOSIS — I10 PRIMARY HYPERTENSION: ICD-10-CM

## 2025-08-25 RX ORDER — LISINOPRIL 40 MG/1
40 TABLET ORAL
Qty: 100 TABLET | Refills: 0 | Status: SHIPPED | OUTPATIENT
Start: 2025-08-25

## 2025-09-29 ENCOUNTER — APPOINTMENT (OUTPATIENT)
Dept: DERMATOLOGY | Facility: CLINIC | Age: 70
End: 2025-09-29
Payer: MEDICARE

## 2025-11-05 ENCOUNTER — APPOINTMENT (OUTPATIENT)
Dept: PRIMARY CARE | Facility: CLINIC | Age: 70
End: 2025-11-05
Payer: MEDICARE

## 2026-05-21 ENCOUNTER — APPOINTMENT (OUTPATIENT)
Dept: NEPHROLOGY | Facility: CLINIC | Age: 71
End: 2026-05-21
Payer: MEDICARE